# Patient Record
Sex: FEMALE | Race: BLACK OR AFRICAN AMERICAN | Employment: OTHER | URBAN - METROPOLITAN AREA
[De-identification: names, ages, dates, MRNs, and addresses within clinical notes are randomized per-mention and may not be internally consistent; named-entity substitution may affect disease eponyms.]

---

## 2018-10-03 ENCOUNTER — HOSPITAL ENCOUNTER (OUTPATIENT)
Dept: NON INVASIVE DIAGNOSTICS | Age: 63
Discharge: HOME | End: 2018-10-03
Payer: MEDICARE

## 2018-10-03 ENCOUNTER — HOSPITAL ENCOUNTER (OUTPATIENT)
Dept: LAB | Age: 63
Discharge: HOME | End: 2018-10-03
Payer: MEDICARE

## 2018-10-03 DIAGNOSIS — S83.241A ACUTE MEDIAL MENISCUS TEAR OF RIGHT KNEE: ICD-10-CM

## 2018-10-03 LAB
ATRIAL RATE: 95 BPM
CALCULATED P AXIS, ECG09: 72 DEGREES
CALCULATED R AXIS, ECG10: 68 DEGREES
CALCULATED T AXIS, ECG11: 72 DEGREES
DIAGNOSIS, 93000: NORMAL
HCT VFR BLD AUTO: 39.3 % (ref 35–45)
HGB BLD-MCNC: 13 G/DL (ref 12–16)
P-R INTERVAL, ECG05: 162 MS
POTASSIUM SERPL-SCNC: 3.2 MMOL/L (ref 3.5–5.5)
Q-T INTERVAL, ECG07: 354 MS
QRS DURATION, ECG06: 90 MS
QTC CALCULATION (BEZET), ECG08: 444 MS
VENTRICULAR RATE, ECG03: 95 BPM

## 2018-10-03 PROCEDURE — 84132 ASSAY OF SERUM POTASSIUM: CPT | Performed by: ORTHOPAEDIC SURGERY

## 2018-10-03 PROCEDURE — 93005 ELECTROCARDIOGRAM TRACING: CPT

## 2018-10-03 PROCEDURE — 85018 HEMOGLOBIN: CPT | Performed by: ORTHOPAEDIC SURGERY

## 2018-10-03 PROCEDURE — 36415 COLL VENOUS BLD VENIPUNCTURE: CPT | Performed by: ORTHOPAEDIC SURGERY

## 2018-10-04 LAB
FAX TO INFO,FAXT: NORMAL
FAX TO NUMBER,FAXN: NORMAL

## 2019-10-04 ENCOUNTER — HOSPITAL ENCOUNTER (OUTPATIENT)
Dept: PREADMISSION TESTING | Age: 64
Discharge: HOME | End: 2019-10-04
Payer: MEDICARE

## 2019-10-04 DIAGNOSIS — Z01.812 BLOOD TESTS PRIOR TO TREATMENT OR PROCEDURE: ICD-10-CM

## 2019-10-04 DIAGNOSIS — M17.11 OSTEOARTHRITIS OF RIGHT KNEE: ICD-10-CM

## 2019-10-04 LAB
ALBUMIN SERPL-MCNC: 3.9 G/DL (ref 3.4–5)
ALBUMIN/GLOB SERPL: 1 {RATIO} (ref 0.8–1.7)
ALP SERPL-CCNC: 77 U/L (ref 45–117)
ALT SERPL-CCNC: 29 U/L (ref 13–56)
ANION GAP SERPL CALC-SCNC: 3 MMOL/L (ref 3–18)
APPEARANCE UR: ABNORMAL
APTT PPP: 27.2 SEC (ref 23–36.4)
AST SERPL-CCNC: 20 U/L (ref 10–38)
BACTERIA SPEC CULT: NORMAL
BACTERIA URNS QL MICRO: ABNORMAL /HPF
BASOPHILS # BLD: 0 K/UL (ref 0–0.1)
BASOPHILS NFR BLD: 0 % (ref 0–2)
BILIRUB SERPL-MCNC: 0.3 MG/DL (ref 0.2–1)
BILIRUB UR QL: NEGATIVE
BUN SERPL-MCNC: 12 MG/DL (ref 7–18)
BUN/CREAT SERPL: 11 (ref 12–20)
CALCIUM SERPL-MCNC: 9.3 MG/DL (ref 8.5–10.1)
CHLORIDE SERPL-SCNC: 106 MMOL/L (ref 100–111)
CO2 SERPL-SCNC: 31 MMOL/L (ref 21–32)
COLOR UR: YELLOW
CREAT SERPL-MCNC: 1.13 MG/DL (ref 0.6–1.3)
CRP SERPL-MCNC: 0.5 MG/DL (ref 0–0.3)
DIFFERENTIAL METHOD BLD: ABNORMAL
EOSINOPHIL # BLD: 0.2 K/UL (ref 0–0.4)
EOSINOPHIL NFR BLD: 3 % (ref 0–5)
EPITH CASTS URNS QL MICRO: ABNORMAL /LPF (ref 0–5)
ERYTHROCYTE [DISTWIDTH] IN BLOOD BY AUTOMATED COUNT: 15.3 % (ref 11.6–14.5)
ERYTHROCYTE [SEDIMENTATION RATE] IN BLOOD: 22 MM/HR (ref 0–30)
GLOBULIN SER CALC-MCNC: 3.9 G/DL (ref 2–4)
GLUCOSE SERPL-MCNC: 89 MG/DL (ref 74–99)
GLUCOSE UR STRIP.AUTO-MCNC: NEGATIVE MG/DL
HBA1C MFR BLD: 6 % (ref 4.2–5.6)
HCT VFR BLD AUTO: 42.5 % (ref 35–45)
HGB BLD-MCNC: 13.6 G/DL (ref 12–16)
HGB UR QL STRIP: NEGATIVE
INR PPP: 0.9 (ref 0.8–1.2)
KETONES UR QL STRIP.AUTO: NEGATIVE MG/DL
LEUKOCYTE ESTERASE UR QL STRIP.AUTO: ABNORMAL
LYMPHOCYTES # BLD: 2.8 K/UL (ref 0.9–3.6)
LYMPHOCYTES NFR BLD: 56 % (ref 21–52)
MCH RBC QN AUTO: 29.9 PG (ref 24–34)
MCHC RBC AUTO-ENTMCNC: 32 G/DL (ref 31–37)
MCV RBC AUTO: 93.4 FL (ref 74–97)
MONOCYTES # BLD: 0.5 K/UL (ref 0.05–1.2)
MONOCYTES NFR BLD: 11 % (ref 3–10)
NEUTS SEG # BLD: 1.5 K/UL (ref 1.8–8)
NEUTS SEG NFR BLD: 30 % (ref 40–73)
NITRITE UR QL STRIP.AUTO: NEGATIVE
PH UR STRIP: 6.5 [PH] (ref 5–8)
PLATELET # BLD AUTO: 292 K/UL (ref 135–420)
PMV BLD AUTO: 9.4 FL (ref 9.2–11.8)
POTASSIUM SERPL-SCNC: 4.1 MMOL/L (ref 3.5–5.5)
PROT SERPL-MCNC: 7.8 G/DL (ref 6.4–8.2)
PROT UR STRIP-MCNC: ABNORMAL MG/DL
PROTHROMBIN TIME: 12.2 SEC (ref 11.5–15.2)
RBC # BLD AUTO: 4.55 M/UL (ref 4.2–5.3)
RBC #/AREA URNS HPF: ABNORMAL /HPF (ref 0–5)
SERVICE CMNT-IMP: NORMAL
SODIUM SERPL-SCNC: 140 MMOL/L (ref 136–145)
SP GR UR REFRACTOMETRY: 1.01 (ref 1–1.03)
UROBILINOGEN UR QL STRIP.AUTO: 0.2 EU/DL (ref 0.2–1)
WBC # BLD AUTO: 4.9 K/UL (ref 4.6–13.2)
WBC URNS QL MICRO: ABNORMAL /HPF (ref 0–5)

## 2019-10-04 PROCEDURE — 83036 HEMOGLOBIN GLYCOSYLATED A1C: CPT

## 2019-10-04 PROCEDURE — 81001 URINALYSIS AUTO W/SCOPE: CPT

## 2019-10-04 PROCEDURE — 85610 PROTHROMBIN TIME: CPT

## 2019-10-04 PROCEDURE — 93005 ELECTROCARDIOGRAM TRACING: CPT

## 2019-10-04 PROCEDURE — 80053 COMPREHEN METABOLIC PANEL: CPT

## 2019-10-04 PROCEDURE — 87641 MR-STAPH DNA AMP PROBE: CPT

## 2019-10-04 PROCEDURE — 85025 COMPLETE CBC W/AUTO DIFF WBC: CPT

## 2019-10-04 PROCEDURE — 86140 C-REACTIVE PROTEIN: CPT

## 2019-10-04 PROCEDURE — 36415 COLL VENOUS BLD VENIPUNCTURE: CPT

## 2019-10-04 PROCEDURE — 85652 RBC SED RATE AUTOMATED: CPT

## 2019-10-04 PROCEDURE — 85730 THROMBOPLASTIN TIME PARTIAL: CPT

## 2019-10-05 LAB
ATRIAL RATE: 80 BPM
CALCULATED P AXIS, ECG09: 67 DEGREES
CALCULATED R AXIS, ECG10: 55 DEGREES
CALCULATED T AXIS, ECG11: 68 DEGREES
DIAGNOSIS, 93000: NORMAL
P-R INTERVAL, ECG05: 154 MS
Q-T INTERVAL, ECG07: 384 MS
QRS DURATION, ECG06: 82 MS
QTC CALCULATION (BEZET), ECG08: 442 MS
VENTRICULAR RATE, ECG03: 80 BPM

## 2019-10-24 NOTE — ROUTINE PROCESS
Addison Al has decided with their surgeon to have a joint replacement to improve mobility and decrease pain. Joint Replacement Pre-Operative class was attended 10/24/2019. Topics discussed included surgery preparation, what to expect the day of surgery, medications (to include a multimodal approach to pain control and limiting narcotics), nutrition, glycemic control, respiratory therapy, physical and occupational therapy, and discharge planning. Discussed the importance of using these alternative pain management methods with the goal of using less opioid use after surgery and at home. A patient education notebook was provided and the opportunity was given to ask questions. The phone number of the Orthopaedic  was provided for any future questions or concerns.

## 2019-10-29 NOTE — H&P
[address] [facility] [address]  History and Physical Exam    Patient: Kamille Torres MRN: [id number]  SSN: [id number]    YOB: 1955  Age: 60 y.o. Sex: female      Subjective:      Chief Complaint: right knee pain    History of Present Illness:  Patient complains of knee pain on the affected side and difficulty ambulating. Pain is increased with increasing activity. Pain is increased with weight bearing. Pain interferes with activities of daily living. Patient has noticed decreased range of motion. Past Medical History:   Diagnosis Date    Arthritis     Autoimmune disease ([facility] [address]     fibromyalgia    Chronic back pain     High cholesterol     Hypertension     Morbid obesity ([facility] [address]     Pain management     Psychiatric disorder      Past Surgical History:   Procedure Laterality Date    HX BACK SURGERY      HX  SECTION      HX ORTHOPAEDIC      c 4 c5 l4 l5 sugery     Social History     Occupational History    Not on file   Tobacco Use    Smoking status: Current Every Day Smoker     Packs/day: 0.25    Smokeless tobacco: Never Used    Tobacco comment: advised to stop smoking 24hrs before surgery   Substance and Sexual Activity    Alcohol use: No    Drug use: No    Sexual activity: Not on file     Prior to Admission medications    Medication Sig Start Date End Date Taking? Authorizing Provider   allopurinol (ZYLOPRIM) 100 mg tablet Take 100 mg by mouth daily. Provider, Historical   escitalopram oxalate (LEXAPRO) 10 mg tablet Take 10 mg by mouth daily. Provider, Historical   pregabalin (LYRICA) 100 mg capsule Take 100 mg by mouth three (3) times daily. Provider, Historical   hydroCHLOROthiazide (HYDRODIURIL) 12.5 mg tablet Take 12.5 mg by mouth daily. Provider, Historical   meloxicam (MOBIC) 15 mg tablet Take 15 mg by mouth daily. Provider, Historical   rosuvastatin (CRESTOR) 20 mg tablet Take 20 mg by mouth nightly.     Provider, Historical amoxicillin (AMOXIL) 875 mg tablet Take 875 mg by mouth two (2) times a day. Indications: Acute Bacterial Infection of the Sinuses    Provider, Historical   fluticasone propionate (FLOVENT DISKUS) 50 mcg/actuation inhaler Take 2 Puffs by inhalation. Provider, Historical   polyvinyl alcohol-povidon,PF, (REFRESH CLASSIC) 1.4-0.6 % ophthalmic solution Administer 1-2 Drops to both eyes as needed. Provider, Historical   oxyCODONE-acetaminophen (ROXICET) 5-325 mg/5 mL solution Take 5 mL by mouth every four (4) hours as needed for Pain. Provider, Historical   oxyCODONE-acetaminophen (PERCOCET) 5-325 mg per tablet Take 1 Tab by mouth every four (4) hours as needed for Pain. Provider, Historical   amitriptyline (ELAVIL) 10 mg tablet Take 25 mg by mouth nightly. Other, MD Alize       Family History Non-contributory. Allergies: Allergies   Allergen Reactions    Latex Rash    Pcn [Penicillins] Unknown (comments)        Review of Systems:  A comprehensive review of systems was negative. Objective:       Physical Exam:  General:  Alert, oriented, pleasant, well-groomed  HEENT:  Normocephalic, atraumatic  Lungs:   Clear to auscultation  Heart:    Regular rate and rhythm  Abdomen:  Soft, non-tender  Extremities:   Pain with range of motion of the affected knee    ROM: decreased    Crepitus with motion of affected knee    Mild effusion       Xrays:   Xrays demonstrate severe arthritic changes including sunchondral cysts, subchondral sclerosis, periarticular osteophytes, and joint space narrowing. Assessment:      Arthritis of the affected knee. This has significantly affected the patient's quality of life. It interferes with activities of daily living. It is worse with weight bearing and activity. Plan:       Proceed with scheduled right total knee.     The patient has failed conservative measures including anti-inflammatories, injections, activity modification, and a trial of physical therapy or external joint support which includes a home exercise program.    The various methods of treatment have been discussed with the patient and family. After consideration of risks, benefits, and other options for treatment, the patient has consented to surgical interventions. Questions were answered and preoperative teaching was done by Dr. Fiona Keys.      Signed By: Owen Hill PA-C     October 29, 2019

## 2019-10-30 ENCOUNTER — ANESTHESIA (OUTPATIENT)
Dept: SURGERY | Age: 64
End: 2019-10-30
Payer: MEDICARE

## 2019-10-30 ENCOUNTER — HOSPITAL ENCOUNTER (OUTPATIENT)
Age: 64
LOS: 1 days | Discharge: HOME HEALTH | End: 2019-10-31
Attending: ORTHOPAEDIC SURGERY | Admitting: ORTHOPAEDIC SURGERY
Payer: MEDICARE

## 2019-10-30 ENCOUNTER — APPOINTMENT (OUTPATIENT)
Dept: GENERAL RADIOLOGY | Age: 64
End: 2019-10-30
Attending: PHYSICIAN ASSISTANT
Payer: MEDICARE

## 2019-10-30 ENCOUNTER — ANESTHESIA EVENT (OUTPATIENT)
Dept: SURGERY | Age: 64
End: 2019-10-30
Payer: MEDICARE

## 2019-10-30 DIAGNOSIS — M17.11 PRIMARY OSTEOARTHRITIS OF RIGHT KNEE: Primary | ICD-10-CM

## 2019-10-30 LAB
ABO + RH BLD: NORMAL
BLOOD GROUP ANTIBODIES SERPL: NORMAL
GLUCOSE BLD STRIP.AUTO-MCNC: 126 MG/DL (ref 70–110)
SPECIMEN EXP DATE BLD: NORMAL

## 2019-10-30 PROCEDURE — 76060000034 HC ANESTHESIA 1.5 TO 2 HR: Performed by: ORTHOPAEDIC SURGERY

## 2019-10-30 PROCEDURE — 77030018846 HC SOL IRR STRL H20 ICUM -A: Performed by: ORTHOPAEDIC SURGERY

## 2019-10-30 PROCEDURE — 77030036563 HC WRP CLD THER KNE S2SG -B: Performed by: ORTHOPAEDIC SURGERY

## 2019-10-30 PROCEDURE — C9290 INJ, BUPIVACAINE LIPOSOME: HCPCS | Performed by: ORTHOPAEDIC SURGERY

## 2019-10-30 PROCEDURE — 74011250636 HC RX REV CODE- 250/636: Performed by: ORTHOPAEDIC SURGERY

## 2019-10-30 PROCEDURE — 77030040361 HC SLV COMPR DVT MDII -B: Performed by: ORTHOPAEDIC SURGERY

## 2019-10-30 PROCEDURE — 77030016060 HC NDL NRV BLK TELE -A: Performed by: ANESTHESIOLOGY

## 2019-10-30 PROCEDURE — 97161 PT EVAL LOW COMPLEX 20 MIN: CPT

## 2019-10-30 PROCEDURE — 77030035643 HC BLD SAW OSC PRECIS STRY -C: Performed by: ORTHOPAEDIC SURGERY

## 2019-10-30 PROCEDURE — 74011000250 HC RX REV CODE- 250: Performed by: NURSE ANESTHETIST, CERTIFIED REGISTERED

## 2019-10-30 PROCEDURE — 73560 X-RAY EXAM OF KNEE 1 OR 2: CPT

## 2019-10-30 PROCEDURE — 82962 GLUCOSE BLOOD TEST: CPT

## 2019-10-30 PROCEDURE — 77030013708 HC HNDPC SUC IRR PULS STRY –B: Performed by: ORTHOPAEDIC SURGERY

## 2019-10-30 PROCEDURE — 74011000250 HC RX REV CODE- 250: Performed by: PHYSICIAN ASSISTANT

## 2019-10-30 PROCEDURE — 64447 NJX AA&/STRD FEMORAL NRV IMG: CPT | Performed by: ORTHOPAEDIC SURGERY

## 2019-10-30 PROCEDURE — 77030020782 HC GWN BAIR PAWS FLX 3M -B: Performed by: ORTHOPAEDIC SURGERY

## 2019-10-30 PROCEDURE — 74011250637 HC RX REV CODE- 250/637: Performed by: PHYSICIAN ASSISTANT

## 2019-10-30 PROCEDURE — 74011250636 HC RX REV CODE- 250/636: Performed by: NURSE ANESTHETIST, CERTIFIED REGISTERED

## 2019-10-30 PROCEDURE — C1713 ANCHOR/SCREW BN/BN,TIS/BN: HCPCS | Performed by: ORTHOPAEDIC SURGERY

## 2019-10-30 PROCEDURE — 74011250636 HC RX REV CODE- 250/636: Performed by: PHYSICIAN ASSISTANT

## 2019-10-30 PROCEDURE — 76942 ECHO GUIDE FOR BIOPSY: CPT | Performed by: ORTHOPAEDIC SURGERY

## 2019-10-30 PROCEDURE — 86900 BLOOD TYPING SEROLOGIC ABO: CPT

## 2019-10-30 PROCEDURE — 77030002933 HC SUT MCRYL J&J -A: Performed by: ORTHOPAEDIC SURGERY

## 2019-10-30 PROCEDURE — 77030018835 HC SOL IRR LR ICUM -A: Performed by: ORTHOPAEDIC SURGERY

## 2019-10-30 PROCEDURE — 77030027138 HC INCENT SPIROMETER -A: Performed by: ORTHOPAEDIC SURGERY

## 2019-10-30 PROCEDURE — 77030037875 HC DRSG MEPILEX <16IN BORD MOLN -A: Performed by: ORTHOPAEDIC SURGERY

## 2019-10-30 PROCEDURE — 76210000016 HC OR PH I REC 1 TO 1.5 HR: Performed by: ORTHOPAEDIC SURGERY

## 2019-10-30 PROCEDURE — 74011250636 HC RX REV CODE- 250/636: Performed by: ANESTHESIOLOGY

## 2019-10-30 PROCEDURE — 76010000153 HC OR TIME 1.5 TO 2 HR: Performed by: ORTHOPAEDIC SURGERY

## 2019-10-30 PROCEDURE — 77030000032 HC CUF TRNQT ZIMM -B: Performed by: ORTHOPAEDIC SURGERY

## 2019-10-30 PROCEDURE — 77030018836 HC SOL IRR NACL ICUM -A: Performed by: ORTHOPAEDIC SURGERY

## 2019-10-30 PROCEDURE — 74011000258 HC RX REV CODE- 258: Performed by: NURSE ANESTHETIST, CERTIFIED REGISTERED

## 2019-10-30 PROCEDURE — 74011000258 HC RX REV CODE- 258: Performed by: ORTHOPAEDIC SURGERY

## 2019-10-30 PROCEDURE — 77030012893

## 2019-10-30 PROCEDURE — C1776 JOINT DEVICE (IMPLANTABLE): HCPCS | Performed by: ORTHOPAEDIC SURGERY

## 2019-10-30 PROCEDURE — 74011000250 HC RX REV CODE- 250: Performed by: ORTHOPAEDIC SURGERY

## 2019-10-30 PROCEDURE — 97116 GAIT TRAINING THERAPY: CPT

## 2019-10-30 PROCEDURE — 36415 COLL VENOUS BLD VENIPUNCTURE: CPT

## 2019-10-30 PROCEDURE — 77030031139 HC SUT VCRL2 J&J -A: Performed by: ORTHOPAEDIC SURGERY

## 2019-10-30 PROCEDURE — 77030039267 HC ADH SKN EXOFIN S2SG -B: Performed by: ORTHOPAEDIC SURGERY

## 2019-10-30 PROCEDURE — 77030012508 HC MSK AIRWY LMA AMBU -A: Performed by: ANESTHESIOLOGY

## 2019-10-30 PROCEDURE — 74011000258 HC RX REV CODE- 258: Performed by: PHYSICIAN ASSISTANT

## 2019-10-30 DEVICE — CRUCIATE RETAINING FEMORAL
Type: IMPLANTABLE DEVICE | Site: KNEE | Status: FUNCTIONAL
Brand: TRIATHLON

## 2019-10-30 DEVICE — COMPONENT TOT KNEE HYBRID POROUS X3 TRIATHLON: Type: IMPLANTABLE DEVICE | Site: KNEE | Status: FUNCTIONAL

## 2019-10-30 DEVICE — TIBIAL BEARING INSERT
Type: IMPLANTABLE DEVICE | Site: KNEE | Status: FUNCTIONAL
Brand: TRIATHLON

## 2019-10-30 DEVICE — PATELLA
Type: IMPLANTABLE DEVICE | Site: KNEE | Status: FUNCTIONAL
Brand: TRIATHLON

## 2019-10-30 DEVICE — TIBIAL COMPONENT
Type: IMPLANTABLE DEVICE | Site: KNEE | Status: FUNCTIONAL
Brand: TRIATHLON

## 2019-10-30 RX ORDER — ROPIVACAINE HYDROCHLORIDE 5 MG/ML
INJECTION, SOLUTION EPIDURAL; INFILTRATION; PERINEURAL
Status: COMPLETED | OUTPATIENT
Start: 2019-10-30 | End: 2019-10-30

## 2019-10-30 RX ORDER — LIDOCAINE HYDROCHLORIDE 20 MG/ML
INJECTION, SOLUTION EPIDURAL; INFILTRATION; INTRACAUDAL; PERINEURAL AS NEEDED
Status: DISCONTINUED | OUTPATIENT
Start: 2019-10-30 | End: 2019-10-30 | Stop reason: HOSPADM

## 2019-10-30 RX ORDER — SODIUM CHLORIDE 0.9 % (FLUSH) 0.9 %
5-40 SYRINGE (ML) INJECTION EVERY 8 HOURS
Status: DISCONTINUED | OUTPATIENT
Start: 2019-10-30 | End: 2019-10-31 | Stop reason: HOSPADM

## 2019-10-30 RX ORDER — FENTANYL CITRATE 50 UG/ML
25 INJECTION, SOLUTION INTRAMUSCULAR; INTRAVENOUS
Status: DISCONTINUED | OUTPATIENT
Start: 2019-10-30 | End: 2019-10-30 | Stop reason: HOSPADM

## 2019-10-30 RX ORDER — SODIUM CHLORIDE 0.9 % (FLUSH) 0.9 %
5-40 SYRINGE (ML) INJECTION EVERY 8 HOURS
Status: DISCONTINUED | OUTPATIENT
Start: 2019-10-30 | End: 2019-10-30 | Stop reason: HOSPADM

## 2019-10-30 RX ORDER — ONDANSETRON 2 MG/ML
4 INJECTION INTRAMUSCULAR; INTRAVENOUS ONCE
Status: DISCONTINUED | OUTPATIENT
Start: 2019-10-30 | End: 2019-10-30 | Stop reason: HOSPADM

## 2019-10-30 RX ORDER — CEFAZOLIN SODIUM/WATER 2 G/20 ML
2 SYRINGE (ML) INTRAVENOUS ONCE
Status: COMPLETED | OUTPATIENT
Start: 2019-10-30 | End: 2019-10-30

## 2019-10-30 RX ORDER — DIPHENHYDRAMINE HYDROCHLORIDE 50 MG/ML
12.5 INJECTION, SOLUTION INTRAMUSCULAR; INTRAVENOUS
Status: DISCONTINUED | OUTPATIENT
Start: 2019-10-30 | End: 2019-10-30 | Stop reason: HOSPADM

## 2019-10-30 RX ORDER — SENNOSIDES 8.6 MG/1
1 TABLET ORAL 2 TIMES DAILY
Status: DISCONTINUED | OUTPATIENT
Start: 2019-10-30 | End: 2019-10-31 | Stop reason: HOSPADM

## 2019-10-30 RX ORDER — SODIUM CHLORIDE, SODIUM LACTATE, POTASSIUM CHLORIDE, CALCIUM CHLORIDE 600; 310; 30; 20 MG/100ML; MG/100ML; MG/100ML; MG/100ML
125 INJECTION, SOLUTION INTRAVENOUS CONTINUOUS
Status: DISCONTINUED | OUTPATIENT
Start: 2019-10-30 | End: 2019-10-31 | Stop reason: HOSPADM

## 2019-10-30 RX ORDER — DEXAMETHASONE SODIUM PHOSPHATE 4 MG/ML
INJECTION, SOLUTION INTRA-ARTICULAR; INTRALESIONAL; INTRAMUSCULAR; INTRAVENOUS; SOFT TISSUE AS NEEDED
Status: DISCONTINUED | OUTPATIENT
Start: 2019-10-30 | End: 2019-10-30 | Stop reason: HOSPADM

## 2019-10-30 RX ORDER — FLUTICASONE PROPIONATE 44 UG/1
1 AEROSOL, METERED RESPIRATORY (INHALATION) 2 TIMES DAILY
Status: DISCONTINUED | OUTPATIENT
Start: 2019-10-30 | End: 2019-10-31 | Stop reason: HOSPADM

## 2019-10-30 RX ORDER — MIDAZOLAM HYDROCHLORIDE 1 MG/ML
INJECTION, SOLUTION INTRAMUSCULAR; INTRAVENOUS
Status: COMPLETED | OUTPATIENT
Start: 2019-10-30 | End: 2019-10-30

## 2019-10-30 RX ORDER — SODIUM CHLORIDE, SODIUM LACTATE, POTASSIUM CHLORIDE, CALCIUM CHLORIDE 600; 310; 30; 20 MG/100ML; MG/100ML; MG/100ML; MG/100ML
150 INJECTION, SOLUTION INTRAVENOUS CONTINUOUS
Status: DISCONTINUED | OUTPATIENT
Start: 2019-10-30 | End: 2019-10-30 | Stop reason: HOSPADM

## 2019-10-30 RX ORDER — ALBUTEROL SULFATE 0.83 MG/ML
2.5 SOLUTION RESPIRATORY (INHALATION) AS NEEDED
Status: DISCONTINUED | OUTPATIENT
Start: 2019-10-30 | End: 2019-10-30 | Stop reason: HOSPADM

## 2019-10-30 RX ORDER — MAGNESIUM SULFATE 100 %
4 CRYSTALS MISCELLANEOUS AS NEEDED
Status: DISCONTINUED | OUTPATIENT
Start: 2019-10-30 | End: 2019-10-30 | Stop reason: HOSPADM

## 2019-10-30 RX ORDER — ALLOPURINOL 100 MG/1
100 TABLET ORAL DAILY
Status: DISCONTINUED | OUTPATIENT
Start: 2019-10-31 | End: 2019-10-31 | Stop reason: HOSPADM

## 2019-10-30 RX ORDER — HYDROCHLOROTHIAZIDE 12.5 MG/1
12.5 CAPSULE ORAL DAILY
Status: DISCONTINUED | OUTPATIENT
Start: 2019-10-31 | End: 2019-10-31 | Stop reason: HOSPADM

## 2019-10-30 RX ORDER — HYDROCODONE BITARTRATE AND ACETAMINOPHEN 5; 325 MG/1; MG/1
1 TABLET ORAL AS NEEDED
Status: DISCONTINUED | OUTPATIENT
Start: 2019-10-30 | End: 2019-10-30 | Stop reason: HOSPADM

## 2019-10-30 RX ORDER — ONDANSETRON 2 MG/ML
INJECTION INTRAMUSCULAR; INTRAVENOUS AS NEEDED
Status: DISCONTINUED | OUTPATIENT
Start: 2019-10-30 | End: 2019-10-30 | Stop reason: HOSPADM

## 2019-10-30 RX ORDER — HYDROMORPHONE HYDROCHLORIDE 2 MG/ML
0.2 INJECTION, SOLUTION INTRAMUSCULAR; INTRAVENOUS; SUBCUTANEOUS AS NEEDED
Status: DISCONTINUED | OUTPATIENT
Start: 2019-10-30 | End: 2019-10-30 | Stop reason: RX

## 2019-10-30 RX ORDER — PROPOFOL 10 MG/ML
INJECTION, EMULSION INTRAVENOUS AS NEEDED
Status: DISCONTINUED | OUTPATIENT
Start: 2019-10-30 | End: 2019-10-30 | Stop reason: HOSPADM

## 2019-10-30 RX ORDER — OXYCODONE AND ACETAMINOPHEN 5; 325 MG/1; MG/1
2 TABLET ORAL
Status: DISCONTINUED | OUTPATIENT
Start: 2019-10-30 | End: 2019-10-31 | Stop reason: HOSPADM

## 2019-10-30 RX ORDER — ROSUVASTATIN CALCIUM 10 MG/1
20 TABLET, COATED ORAL
Status: DISCONTINUED | OUTPATIENT
Start: 2019-10-30 | End: 2019-10-31 | Stop reason: HOSPADM

## 2019-10-30 RX ORDER — CEFAZOLIN SODIUM/WATER 2 G/20 ML
2 SYRINGE (ML) INTRAVENOUS EVERY 8 HOURS
Status: COMPLETED | OUTPATIENT
Start: 2019-10-30 | End: 2019-10-31

## 2019-10-30 RX ORDER — DIPHENHYDRAMINE HYDROCHLORIDE 50 MG/ML
12.5 INJECTION, SOLUTION INTRAMUSCULAR; INTRAVENOUS
Status: DISCONTINUED | OUTPATIENT
Start: 2019-10-30 | End: 2019-10-31 | Stop reason: HOSPADM

## 2019-10-30 RX ORDER — NALOXONE HYDROCHLORIDE 0.4 MG/ML
0.4 INJECTION, SOLUTION INTRAMUSCULAR; INTRAVENOUS; SUBCUTANEOUS AS NEEDED
Status: DISCONTINUED | OUTPATIENT
Start: 2019-10-30 | End: 2019-10-31 | Stop reason: HOSPADM

## 2019-10-30 RX ORDER — PREGABALIN 100 MG/1
100 CAPSULE ORAL 3 TIMES DAILY
Status: DISCONTINUED | OUTPATIENT
Start: 2019-10-30 | End: 2019-10-31 | Stop reason: HOSPADM

## 2019-10-30 RX ORDER — HYDROMORPHONE HYDROCHLORIDE 1 MG/ML
INJECTION, SOLUTION INTRAMUSCULAR; INTRAVENOUS; SUBCUTANEOUS AS NEEDED
Status: DISCONTINUED | OUTPATIENT
Start: 2019-10-30 | End: 2019-10-30 | Stop reason: HOSPADM

## 2019-10-30 RX ORDER — SODIUM CHLORIDE 0.9 % (FLUSH) 0.9 %
5-40 SYRINGE (ML) INJECTION AS NEEDED
Status: DISCONTINUED | OUTPATIENT
Start: 2019-10-30 | End: 2019-10-31 | Stop reason: HOSPADM

## 2019-10-30 RX ORDER — SODIUM CHLORIDE, SODIUM LACTATE, POTASSIUM CHLORIDE, CALCIUM CHLORIDE 600; 310; 30; 20 MG/100ML; MG/100ML; MG/100ML; MG/100ML
300 INJECTION, SOLUTION INTRAVENOUS CONTINUOUS
Status: DISCONTINUED | OUTPATIENT
Start: 2019-10-30 | End: 2019-10-31 | Stop reason: HOSPADM

## 2019-10-30 RX ORDER — SODIUM CHLORIDE 0.9 % (FLUSH) 0.9 %
5-40 SYRINGE (ML) INJECTION AS NEEDED
Status: DISCONTINUED | OUTPATIENT
Start: 2019-10-30 | End: 2019-10-30 | Stop reason: HOSPADM

## 2019-10-30 RX ORDER — KETAMINE HCL IN 0.9 % NACL 50 MG/5 ML
SYRINGE (ML) INTRAVENOUS AS NEEDED
Status: DISCONTINUED | OUTPATIENT
Start: 2019-10-30 | End: 2019-10-30 | Stop reason: HOSPADM

## 2019-10-30 RX ORDER — NALOXONE HYDROCHLORIDE 0.4 MG/ML
0.1 INJECTION, SOLUTION INTRAMUSCULAR; INTRAVENOUS; SUBCUTANEOUS AS NEEDED
Status: DISCONTINUED | OUTPATIENT
Start: 2019-10-30 | End: 2019-10-30 | Stop reason: HOSPADM

## 2019-10-30 RX ORDER — ESCITALOPRAM OXALATE 10 MG/1
10 TABLET ORAL DAILY
Status: DISCONTINUED | OUTPATIENT
Start: 2019-10-31 | End: 2019-10-31 | Stop reason: HOSPADM

## 2019-10-30 RX ORDER — AMITRIPTYLINE HYDROCHLORIDE 50 MG/1
25 TABLET, FILM COATED ORAL
Status: DISCONTINUED | OUTPATIENT
Start: 2019-10-30 | End: 2019-10-31 | Stop reason: HOSPADM

## 2019-10-30 RX ORDER — PHENYLEPHRINE HCL IN 0.9% NACL 1 MG/10 ML
SYRINGE (ML) INTRAVENOUS AS NEEDED
Status: DISCONTINUED | OUTPATIENT
Start: 2019-10-30 | End: 2019-10-30 | Stop reason: HOSPADM

## 2019-10-30 RX ORDER — ONDANSETRON 2 MG/ML
4 INJECTION INTRAMUSCULAR; INTRAVENOUS
Status: DISCONTINUED | OUTPATIENT
Start: 2019-10-30 | End: 2019-10-31 | Stop reason: HOSPADM

## 2019-10-30 RX ORDER — HYDROMORPHONE HYDROCHLORIDE 1 MG/ML
1 INJECTION, SOLUTION INTRAMUSCULAR; INTRAVENOUS; SUBCUTANEOUS
Status: DISCONTINUED | OUTPATIENT
Start: 2019-10-30 | End: 2019-10-31 | Stop reason: HOSPADM

## 2019-10-30 RX ORDER — GLYCOPYRROLATE 0.2 MG/ML
INJECTION INTRAMUSCULAR; INTRAVENOUS AS NEEDED
Status: DISCONTINUED | OUTPATIENT
Start: 2019-10-30 | End: 2019-10-30 | Stop reason: HOSPADM

## 2019-10-30 RX ORDER — EPHEDRINE SULFATE/0.9% NACL/PF 50 MG/5 ML
SYRINGE (ML) INTRAVENOUS AS NEEDED
Status: DISCONTINUED | OUTPATIENT
Start: 2019-10-30 | End: 2019-10-30 | Stop reason: HOSPADM

## 2019-10-30 RX ORDER — HYDROMORPHONE HYDROCHLORIDE 2 MG/ML
0.2 INJECTION, SOLUTION INTRAMUSCULAR; INTRAVENOUS; SUBCUTANEOUS AS NEEDED
Status: DISCONTINUED | OUTPATIENT
Start: 2019-10-30 | End: 2019-10-30 | Stop reason: HOSPADM

## 2019-10-30 RX ORDER — MELOXICAM 7.5 MG/1
15 TABLET ORAL DAILY
Status: DISCONTINUED | OUTPATIENT
Start: 2019-10-31 | End: 2019-10-31 | Stop reason: HOSPADM

## 2019-10-30 RX ADMIN — DEXMEDETOMIDINE HYDROCHLORIDE 10 MCG: 100 INJECTION, SOLUTION INTRAVENOUS at 12:17

## 2019-10-30 RX ADMIN — DEXAMETHASONE SODIUM PHOSPHATE 8 MG: 4 INJECTION, SOLUTION INTRAMUSCULAR; INTRAVENOUS at 12:05

## 2019-10-30 RX ADMIN — Medication 10 MG: at 11:58

## 2019-10-30 RX ADMIN — HYDROMORPHONE HYDROCHLORIDE 0.5 MG: 1 INJECTION, SOLUTION INTRAMUSCULAR; INTRAVENOUS; SUBCUTANEOUS at 11:47

## 2019-10-30 RX ADMIN — Medication 10 MG: at 12:26

## 2019-10-30 RX ADMIN — SODIUM CHLORIDE, SODIUM LACTATE, POTASSIUM CHLORIDE, AND CALCIUM CHLORIDE: 600; 310; 30; 20 INJECTION, SOLUTION INTRAVENOUS at 12:00

## 2019-10-30 RX ADMIN — OXYCODONE HYDROCHLORIDE AND ACETAMINOPHEN 2 TABLET: 5; 325 TABLET ORAL at 20:59

## 2019-10-30 RX ADMIN — DEXMEDETOMIDINE HYDROCHLORIDE 10 MCG: 100 INJECTION, SOLUTION INTRAVENOUS at 13:27

## 2019-10-30 RX ADMIN — AMITRIPTYLINE HYDROCHLORIDE 25 MG: 50 TABLET, FILM COATED ORAL at 22:39

## 2019-10-30 RX ADMIN — MIDAZOLAM 2 MG: 1 INJECTION INTRAMUSCULAR; INTRAVENOUS at 10:24

## 2019-10-30 RX ADMIN — Medication 100 MCG: at 12:57

## 2019-10-30 RX ADMIN — GLYCOPYRROLATE 0.1 MG: 0.2 INJECTION INTRAMUSCULAR; INTRAVENOUS at 11:47

## 2019-10-30 RX ADMIN — Medication 100 MCG: at 12:00

## 2019-10-30 RX ADMIN — Medication 10 MG: at 12:32

## 2019-10-30 RX ADMIN — SODIUM CHLORIDE, SODIUM LACTATE, POTASSIUM CHLORIDE, AND CALCIUM CHLORIDE: 600; 310; 30; 20 INJECTION, SOLUTION INTRAVENOUS at 12:47

## 2019-10-30 RX ADMIN — ROPIVACAINE HYDROCHLORIDE 20 ML: 5 INJECTION, SOLUTION EPIDURAL; INFILTRATION; PERINEURAL at 10:24

## 2019-10-30 RX ADMIN — Medication 2 G: at 11:59

## 2019-10-30 RX ADMIN — SENNOSIDES 8.6 MG: 8.6 TABLET, FILM COATED ORAL at 20:31

## 2019-10-30 RX ADMIN — MIDAZOLAM 2 MG: 1 INJECTION INTRAMUSCULAR; INTRAVENOUS at 11:47

## 2019-10-30 RX ADMIN — PROPOFOL 150 MG: 10 INJECTION, EMULSION INTRAVENOUS at 11:54

## 2019-10-30 RX ADMIN — ONDANSETRON HYDROCHLORIDE 4 MG: 2 INJECTION INTRAMUSCULAR; INTRAVENOUS at 12:05

## 2019-10-30 RX ADMIN — SODIUM CHLORIDE, SODIUM LACTATE, POTASSIUM CHLORIDE, AND CALCIUM CHLORIDE 125 ML/HR: 600; 310; 30; 20 INJECTION, SOLUTION INTRAVENOUS at 09:02

## 2019-10-30 RX ADMIN — LIDOCAINE HYDROCHLORIDE 100 MG: 20 INJECTION, SOLUTION EPIDURAL; INFILTRATION; INTRACAUDAL; PERINEURAL at 11:54

## 2019-10-30 RX ADMIN — TRANEXAMIC ACID 1 G: 100 INJECTION, SOLUTION INTRAVENOUS at 13:02

## 2019-10-30 RX ADMIN — DEXMEDETOMIDINE HYDROCHLORIDE 10 MCG: 100 INJECTION, SOLUTION INTRAVENOUS at 12:00

## 2019-10-30 RX ADMIN — ROSUVASTATIN CALCIUM 20 MG: 10 TABLET, COATED ORAL at 22:38

## 2019-10-30 RX ADMIN — Medication 10 MG: at 12:17

## 2019-10-30 RX ADMIN — TRANEXAMIC ACID 1 G: 100 INJECTION, SOLUTION INTRAVENOUS at 12:00

## 2019-10-30 RX ADMIN — PREGABALIN 100 MG: 100 CAPSULE ORAL at 22:39

## 2019-10-30 RX ADMIN — Medication 30 MG: at 12:06

## 2019-10-30 RX ADMIN — Medication 100 MCG: at 11:58

## 2019-10-30 RX ADMIN — Medication 2 G: at 20:31

## 2019-10-30 RX ADMIN — HYDROMORPHONE HYDROCHLORIDE 0.5 MG: 1 INJECTION, SOLUTION INTRAMUSCULAR; INTRAVENOUS; SUBCUTANEOUS at 12:10

## 2019-10-30 RX ADMIN — OXYCODONE HYDROCHLORIDE AND ACETAMINOPHEN 2 TABLET: 5; 325 TABLET ORAL at 16:59

## 2019-10-30 NOTE — BRIEF OP NOTE
BRIEF OPERATIVE NOTE    Date of Procedure: 10/30/2019   Preoperative Diagnosis:    Postoperative Diagnosis:      Procedure(s):  RIGHT TOTAL KNEE REPLACEMENT, \"SPEC POP\"  Surgeon(s) and Role:     Caren Bray MD - Primary         Surgical Assistant: none    Surgical Staff:  Circ-1: Jas Good RN  Physician Assistant: Marin Gaming PA-C  Scrub Tech-1: Pramod Livings  Surg Asst-1: Rivkadiaze Rumdonald  Event Time In Time Out   Incision Start 1207    Incision Close 1321      Anesthesia: Other   Estimated Blood Loss: 80cc  Specimens: * No specimens in log *   Findings: severe DJD   Complications: none  Implants:   Implant Name Type Inv.  Item Serial No.  Lot No. LRB No. Used Action   PAT ASYM MTL-BK 11MM SZ A38 -- TRIATHLON - [id number]  PAT ASYM MTL-BK 11MM SZ A38 -- TRIATHLON  [employer] HOW [id number] Right 1 Implanted   INSERT TIB ARTC BRNG SZ 4 9MM -- TRIATHLON X3 - [id number]  INSERT TIB ARTC BRNG SZ 4 9MM -- TRIATHLON X3  [employer] HOW [id number] Right 1 Implanted   COMPNT FEM CR TRIATHLN 4 R PA --  - [id number]  COMPNT FEM CR TRIATHLN 4 R PA --   [employer] HOW [id number] Right 1 Implanted   BASEPLT TIB PC TRITNM SZ 4 -- TRIATHLON - [id number]  BASEPLT TIB PC TRITNM SZ 4 -- TRIATHLON  [facility] [facility] [employer]EDICS HOW [id number] Right 1 Implanted

## 2019-10-30 NOTE — PROGRESS NOTES
Problem: Mobility Impaired (Adult and Pediatric)  Goal: *Acute Goals and Plan of Care (Insert Text)  Description  Physical Therapy Goals   Initiated 10/30/2019 and to be accomplished within 3-5 day(s)  1. Patient will move from supine <> sit with S in prep for out of bed activity and change of position. 2.  Patient will perform sit<> stand with S with LRAD in prep for transfers/ambulation. 3.  Patient will transfer from bed <> chair with S with LRAD for time up in chair for completion of ADL activity. 4.  Patient will ambulate 150 feet with LRAD/S for improved functional mobility/safe discharge. 5.  Patient will ascend/descend 3-5 stairs with handrail with minimal assistance/contact guard assist for home re-entry as needed. Outcome: Progressing Towards Goal   PHYSICAL THERAPY EVALUATION    Patient: Miguel Damon (22 y.o. female)  Date: 10/30/2019  Primary Diagnosis: Osteoarthritis of right knee [M17.11]  Procedure(s) (LRB):  RIGHT TOTAL KNEE REPLACEMENT, \"SPEC POP\" (Right) Day of Surgery   Precautions:Fall, WBAT    ASSESSMENT :  Based on the objective data described below, the patient presents with decrease independence w/ bed mobility, transfers, gait, and step negotiaiton. Pt seen in supine prior to session w/ supplemental O2, IV, BP, pulse ox, and B/L SCDs donned. Pt reported 8/10 pain at this time but reports already receiving pain meds prior to session. Pt has no c/o lightheadedness, dizziness or nausea. Pt's vitals assessed WNLs. Pt is very drowsy during session. Pt able to ambulate in the hallway w/ RW/GB and demonstrates a step to, antalgic gait, decrease jordan, decrease stride length, and decrease step clearance. Pt transferred back to room where pt was left in supine after session, call bell and tray in reach, B/L SCDs donned, vitals assessed WNLs, nurse notified after session. Patient will benefit from skilled intervention to address the above impairments.   Patients rehabilitation potential is considered to be Good  Factors which may influence rehabilitation potential include:   ? None noted  ? Mental ability/status  ? Medical condition  ? Home/family situation and support systems  ? Safety awareness  ? Pain tolerance/management  ? Other:      PLAN :  Recommendations and Planned Interventions:  ?           Bed Mobility Training             ? Neuromuscular Re-Education  ? Transfer Training                   ? Orthotic/Prosthetic Training  ? Gait Training                          ? Modalities  ? Therapeutic Exercises          ? Edema Management/Control  ? Therapeutic Activities            ? Patient and Family Training/Education  ? Other (comment):    Frequency/Duration: Patient will be followed by physical therapy twice daily to address goals. Discharge Recommendations: Home Health  Further Equipment Recommendations for Discharge: N/A     SUBJECTIVE:   Patient stated I was just about to take a nap.     OBJECTIVE DATA SUMMARY:     Past Medical History:   Diagnosis Date    Arthritis     Autoimmune disease ([facility] [address]     fibromyalgia    Chronic back pain     High cholesterol     Hypertension     Morbid obesity (HCC)     Pain management     Psychiatric disorder      Past Surgical History:   Procedure Laterality Date    HX BACK SURGERY      HX  SECTION      HX ORTHOPAEDIC      c 4 c5 l4 l5 sugery     Barriers to Learning/Limitations: yes;  physical  Compensate with: Verbal Cues and Tactile Cues  Prior Level of Function/Home Situation:   Home Situation  Home Environment: Private residence  # Steps to Enter: 1  One/Two Story Residence: Two story  # of Interior Steps: 11  Interior Rails: Left  Living Alone: No  Support Systems: Spouse/Significant Other/Partner  Patient Expects to be Discharged to[de-identified] Private residence  Current DME Used/Available at Home: Juanito Murray, rolling  Critical Behavior:  Neurologic State: Drowsy  Orientation Level: Oriented X4  Psychosocial  Purposeful Interaction: Yes  Pt Identified Daily Priority: Clinical issues (comment)  Caritas Process: Nurture loving kindness  Skin Condition/Temp: Dry;Warm  Skin Integrity: Incision (comment)(right knee)  Skin Integumentary  Skin Color: Appropriate for ethnicity  Skin Condition/Temp: Dry;Warm  Skin Integrity: Incision (comment)(right knee)  Turgor: Non-tenting  Hair Growth: Present  Varicosities: Absent  Strength:    Strength: Generally decreased, functional  Tone & Sensation:   Tone: Normal  Sensation: Intact  Range Of Motion:  AROM: Generally decreased, functional  Functional Mobility:  Bed Mobility:  Supine to Sit: Contact guard assistance  Sit to Supine: Contact guard assistance  Scooting: Contact guard assistance  Transfers:  Sit to Stand: Contact guard assistance;Minimum assistance  Stand to Sit: Contact guard assistance  Balance:   Sitting: Intact  Standing: Intact; With support  Ambulation/Gait Training:  Distance (ft): 45 Feet (ft)  Assistive Device: Gait belt;Walker, rolling  Ambulation - Level of Assistance: Contact guard assistance;Minimal assistance  Gait Description (WDL): Exceptions to WDL  Gait Abnormalities: Antalgic;Decreased step clearance; Step to gait  Right Side Weight Bearing: As tolerated  Base of Support: Shift to left  Stance: Right decreased  Speed/[place]: Slow  Step Length: Left shortened;Right shortened  Swing Pattern: Left asymmetrical;Right asymmetrical  Therapeutic Exercises: Therex packet handed to pt upon assessment  Pain:  Pain Scale 1: Numeric (0 - 10)  Pain Intensity 1: 6  Pain Location 1: Knee  Pain Orientation 1: Right  Pain Description 1: Aching  Pain Intervention(s) 1: Medication (see MAR)  Activity Tolerance:   Fair  Please refer to the flowsheet for vital signs taken during this treatment.   After treatment:   ?         Patient left in no apparent distress sitting up in chair  ? Patient left in no apparent distress in bed  ? Call bell left within reach  ? Nursing notified  ? Caregiver present (spouse)  ? Bed alarm activated    COMMUNICATION/EDUCATION:   ?         Fall prevention education was provided and the patient/caregiver indicated understanding. ? Patient/family have participated as able in goal setting and plan of care. ?         Patient/family agree to work toward stated goals and plan of care. ?         Patient understands intent and goals of therapy, but is neutral about his/her participation. ? Patient is unable to participate in goal setting and plan of care.     Thank you for this referral.  Ritchie Townsend, PT   Time Calculation: 23 mins   Eval Complexity: History: HIGH Complexity :3+ comorbidities / personal factors will impact the outcome/ POC Exam:LOW Complexity : 1-2 Standardized tests and measures addressing body structure, function, activity limitation and / or participation in recreation  Presentation: LOW Complexity : Stable, uncomplicated  Clinical Decision Making:Low Complexity ambulate >30ft  Overall Complexity:LOW

## 2019-10-30 NOTE — ANESTHESIA PROCEDURE NOTES
Peripheral Block    Start time: 10/30/2019 10:19 AM  End time: 10/30/2019 10:25 AM  Performed by: Neymar Howard MD  Authorized by: Neymar Howard MD       Pre-procedure: Indications: at surgeon's request, post-op pain management and procedure for pain    Preanesthetic Checklist: patient identified, risks and benefits discussed, site marked, timeout performed, anesthesia consent given and patient being monitored      Block Type:   Block Type:   Adductor canal  Laterality:  Right  Monitoring:  Standard ASA monitoring, continuous pulse ox, frequent vital sign checks, heart rate, oxygen and responsive to questions  Injection Technique:  Single shot  Procedures: ultrasound guided    Patient Position: supine  Prep: chlorhexidine    Location:  Mid thigh  Needle Type:  Stimuplex  Needle Gauge:  20 G  Needle Localization:  Anatomical landmarks and ultrasound guidance    Assessment:  Number of attempts:  1  Injection Assessment:  Incremental injection every 5 mL, negative aspiration for CSF, no paresthesia, ultrasound image on chart, low pressure verified by pressure monitor, no intravascular symptoms, negative aspiration for blood and local visualized surrounding nerve on ultrasound  Patient tolerance:  Patient tolerated the procedure well with no immediate complications

## 2019-10-30 NOTE — ADDENDUM NOTE
Addendum  created 10/30/19 1939 by Radha Cash MD    Attestation recorded in [facility] filed

## 2019-10-30 NOTE — INTERVAL H&P NOTE
H&P Update: 
Kendall Grullon was seen and examined. History and physical has been reviewed. The patient has been examined. There have been no significant clinical changes since the completion of the originally dated History and Physical. 
Patient identified by surgeon; surgical site was confirmed by patient and surgeon.

## 2019-10-30 NOTE — ANESTHESIA POSTPROCEDURE EVALUATION
Procedure(s):  RIGHT TOTAL KNEE REPLACEMENT, \"SPEC POP\". general    Anesthesia Post Evaluation      Multimodal analgesia: multimodal analgesia not used between 6 hours prior to anesthesia start to PACU discharge  Patient location during evaluation: bedside  Level of consciousness: awake and alert  Pain management: satisfactory to patient  Airway patency: patent  Anesthetic complications: no  Cardiovascular status: acceptable  Respiratory status: acceptable  Hydration status: acceptable  Post anesthesia nausea and vomiting:  none      Vitals Value Taken Time   /58 10/30/2019  2:50 PM   Temp 37.4 °C (99.4 °F) 10/30/2019  1:36 PM   Pulse 86 10/30/2019  2:51 PM   Resp 15 10/30/2019  2:51 PM   SpO2 96 % 10/30/2019  2:51 PM   Vitals shown include unvalidated device data.

## 2019-10-30 NOTE — OP NOTES
TOTAL KNEE ARTHROPLASTY OP NOTE      OPERATIVE REPORT    Patient: Reyes Dowd CSN: [id number] [id number]: [id number]    YOB: 1955  Age: 60 y.o. Sex: female      Admit Date: 10/30/2019  Admit Diagnosis: Osteoarthritis of right knee [M17.11]    Date of Procedure: 10/30/2019   Preoperative Diagnosis:    Postoperative Diagnosis:      Procedure: Procedure(s):  RIGHT TOTAL KNEE REPLACEMENT, \"SPEC POP\"  Surgeon: Petar Robin MD  Assistant(s):  BOYD Car  Anesthesia: Other   Estimated Blood Loss:<50CC  Specimens: * No specimens in log *   Complications: None  Implants:   Implant Name Type Inv. Item Serial No.  Lot No. LRB No. Used Action   PAT ASYM MTL-BK 11MM SZ A38 -- TRIATHLON - [id number]  PAT ASYM MTL-BK 11MM SZ A38 -- TRIATHLON  [employer] HOW [id number] Right 1 Implanted   INSERT TIB ARTC BRNG SZ 4 9MM -- TRIATHLON X3 - [id number]  INSERT TIB ARTC BRNG SZ 4 9MM -- TRIATHLON X3  [employer] HOW [id number] Right 1 Implanted   COMPNT FEM CR TRIATHLN 4 R PA --  - [id number]  COMPNT FEM CR TRIATHLN 4 R PA --   [employer] HOW [id number] Right 1 Implanted   BASEPLT TIB PC TRITNM SZ 4 -- TRIATHLON - [id number]  BASEPLT TIB PC TRITNM SZ 4 -- TRIATHLON  [employer] HOW [id number] Right 1 Implanted         INDICATIONS: The patient is a 60 y.o., female who has who has been suffering from knee arthritis. She has failed conservative therapy including activity modification, anti-inflammatories and injections. The arthritis is significantly impacting the patient's quality of life. We did discuss the option of total knee arthroplasty with them at length. She understood the risks and benefits including the risks of infection and blood clot, and elected to proceed with knee replacement.     DESCRIPTION OF PROCEDURE: After being informed of the risks and benefits, which include, but are not limited to, bleeding, infection, neurovascular damage, wound complications, pain and stiffness in the knee, periprosthetic loosening, fracture dislocation and DVT, the patient consented for the procedure. The patient was brought to the operating suite and properly identified. She was placed supine upon the operating table and placed under a general anesthetic. A regional block was placed in preoperative holding. Once an adequate level of anesthesia was obtained, a tourniquet was placed high on the operative thigh. The leg was prepped and draped in the usual sterile fashion. The leg was exsanguinated with an Esmarch. The tourniquet was inflated to 280 mmHg. Tourniquet time was approximately 1 hour. A longitudinal incision was made centered over the patella. Dissection was carried down through the subcutaneous tissue. The underlying capsule was identified and a medial parapatellar arthrotomy was made in standard fashion. The joint was exposed and evaluated. There were severe arthritic changes noted. The patella was everted. Approximately 1 cm of the articular surface was removed in a freehand fashion. Once that was completed, the knee was flexed. The distal intramedullary guide was placed and a 5-degree, 8-mm cut was made in standard fashion. The distal femur was sized, the 4-in-1 cutting block was placed in line with the posterior condyle and epicondylar access, and the anterior, posterior, and chamfer cuts were made in standard fashion. Attention was next turned to the tibia. Using the extramedullary tibial guide parallel to the axis of the tibia, we resected 9 mm below the higher plateau. The remaining plateau was sized. All osteophytes were removed. The remaining medial and lateral meniscus was excised. The trial implants were then placed with a trial insert. The knee had full flexion, full extension and good stability throughout. The PCL was noted to be intact. The patella was sized to the appropriate button. The keel holes were drilled, and the poly was placed.  The patella tracked nicely throughout full range of motion. The trial implants were then removed. The bone ends were irrigated and dried. The final implants were then pressed into place beginning with the tibia, followed by the femur, and lastly the patella. The polyethylene spacer was locked into the tibial tray. The tourniquet was deflated. Bleeding was controlled with electrocautery. Bleeding was not felt to be excessive enough to warrant a drain. The capsule was closed with a #1 Vicryl in a figure-of-eight interrupted fashion. Subcutaneous tissue was closed with 2-0 Vicryl. The skin was closed with 3-0 Monocryl in a running subcuticular fashion. Steri-strips and a sterile compressive dressing was applied. The patient was awakened and transferred to the recovery in stable condition. All needle and sponge counts were reported as correct at the end of the case.

## 2019-10-30 NOTE — PROGRESS NOTES
[id number] - Patient arrives to unit at this time. Admission completed at this time. Patient is A/O x 3. IV to right hand intact and patent. Plexis to feet and Eder applied to left leg. Elastic dressing to right leg CDI. Denies numbness/tingling. Pedal pulses palpable. Pain 0/10. Fall risk band on patient. Patient was oriented to the room to include use of call bell, meal ordering, and use of incentive spirometer patient able to get up to 1250 on IS. Patient was given explanation of \" up for dinner\" program and has verbalized understanding. Bed in lowest position. Phone and call bell left within reach. Patient educated on need to call for assistance before getting OOB. Plan of care for the day addressed with patient. Educated on pain medication availability and possible side effects. 1700-Refused to get in chair for meal.    1741-Pt reportedly walked in hallway with PT. Summary-voided some incontinence, ambulated some drowsiness, pt heard saying percocet knocks me right out.

## 2019-10-30 NOTE — PERIOP NOTES
TRANSFER - OUT REPORT:    Verbal report given to [facility] RN (name) on Christopher Salinas  being transferred to 2 S (unit) for routine progression of care       Report consisted of patients Situation, Background, Assessment and   Recommendations(SBAR). Information from the following report(s) SBAR, Kardex, OR Summary, Procedure Summary, Intake/Output and MAR was reviewed with the receiving nurse. Lines:   Peripheral IV 10/30/19 Posterior;Right Hand (Active)   Site Assessment Clean, dry, & intact 10/30/2019  3:16 PM   Phlebitis Assessment 0 10/30/2019  3:16 PM   Infiltration Assessment 0 10/30/2019  3:16 PM   Dressing Status Clean, dry, & intact 10/30/2019  3:16 PM   Dressing Type Transparent;Tape 10/30/2019  3:16 PM   Hub Color/Line Status Infusing 10/30/2019  3:16 PM        Intake/Output Summary (Last 24 hours) at 10/30/2019 1538  Last data filed at 10/30/2019 1538  Gross per 24 hour   Intake 2850 ml   Output 80 ml   Net 2770 ml       Opportunity for questions and clarification was provided.       Patient transported with:   O2 @ 2 liters  Registered Nurse

## 2019-10-30 NOTE — ROUTINE PROCESS
TRANSFER - IN REPORT: 
 
Verbal report received from [facility] JOSE JUAN RN(name) on Bobby Inman  being received from codebender) for routine post - op Report consisted of patients Situation, Background, Assessment and  
Recommendations(SBAR). Information from the following report(s) SBAR, OR Summary, Procedure Summary, Intake/Output, MAR and Recent Results was reviewed with the receiving nurse. Opportunity for questions and clarification was provided. Assessment completed upon patients arrival to unit and care assumed.

## 2019-10-31 VITALS
SYSTOLIC BLOOD PRESSURE: 112 MMHG | WEIGHT: 218.5 LBS | HEART RATE: 95 BPM | OXYGEN SATURATION: 100 % | DIASTOLIC BLOOD PRESSURE: 89 MMHG | BODY MASS INDEX: 36.4 KG/M2 | HEIGHT: 65 IN | TEMPERATURE: 98.3 F | RESPIRATION RATE: 16 BRPM

## 2019-10-31 LAB
ANION GAP SERPL CALC-SCNC: 7 MMOL/L (ref 3–18)
BUN SERPL-MCNC: 13 MG/DL (ref 7–18)
BUN/CREAT SERPL: 13 (ref 12–20)
CALCIUM SERPL-MCNC: 8.9 MG/DL (ref 8.5–10.1)
CHLORIDE SERPL-SCNC: 103 MMOL/L (ref 100–111)
CO2 SERPL-SCNC: 29 MMOL/L (ref 21–32)
CREAT SERPL-MCNC: 1.01 MG/DL (ref 0.6–1.3)
ERYTHROCYTE [DISTWIDTH] IN BLOOD BY AUTOMATED COUNT: 14.9 % (ref 11.6–14.5)
GLUCOSE SERPL-MCNC: 136 MG/DL (ref 74–99)
HCT VFR BLD AUTO: 36.9 % (ref 35–45)
HGB BLD-MCNC: 11.8 G/DL (ref 12–16)
MCH RBC QN AUTO: 29.9 PG (ref 24–34)
MCHC RBC AUTO-ENTMCNC: 32 G/DL (ref 31–37)
MCV RBC AUTO: 93.4 FL (ref 74–97)
PLATELET # BLD AUTO: 315 K/UL (ref 135–420)
PMV BLD AUTO: 9.5 FL (ref 9.2–11.8)
POTASSIUM SERPL-SCNC: 3.8 MMOL/L (ref 3.5–5.5)
RBC # BLD AUTO: 3.95 M/UL (ref 4.2–5.3)
SODIUM SERPL-SCNC: 139 MMOL/L (ref 136–145)
WBC # BLD AUTO: 11.1 K/UL (ref 4.6–13.2)

## 2019-10-31 PROCEDURE — 85027 COMPLETE CBC AUTOMATED: CPT

## 2019-10-31 PROCEDURE — 97116 GAIT TRAINING THERAPY: CPT

## 2019-10-31 PROCEDURE — 74011250637 HC RX REV CODE- 250/637: Performed by: PHYSICIAN ASSISTANT

## 2019-10-31 PROCEDURE — 36415 COLL VENOUS BLD VENIPUNCTURE: CPT

## 2019-10-31 PROCEDURE — 80048 BASIC METABOLIC PNL TOTAL CA: CPT

## 2019-10-31 PROCEDURE — 97110 THERAPEUTIC EXERCISES: CPT

## 2019-10-31 PROCEDURE — 74011250636 HC RX REV CODE- 250/636: Performed by: PHYSICIAN ASSISTANT

## 2019-10-31 RX ORDER — NALOXONE HYDROCHLORIDE 4 MG/.1ML
SPRAY NASAL
Qty: 3 EACH | Refills: 0 | Status: SHIPPED | OUTPATIENT
Start: 2019-10-31

## 2019-10-31 RX ORDER — OXYCODONE AND ACETAMINOPHEN 5; 325 MG/1; MG/1
2 TABLET ORAL
Qty: 30 TAB | Refills: 0 | Status: SHIPPED | OUTPATIENT
Start: 2019-10-31 | End: 2019-11-03

## 2019-10-31 RX ADMIN — ESCITALOPRAM OXALATE 10 MG: 10 TABLET ORAL at 08:09

## 2019-10-31 RX ADMIN — OXYCODONE HYDROCHLORIDE AND ACETAMINOPHEN 2 TABLET: 5; 325 TABLET ORAL at 04:26

## 2019-10-31 RX ADMIN — RIVAROXABAN 10 MG: 10 TABLET, FILM COATED ORAL at 00:51

## 2019-10-31 RX ADMIN — OXYCODONE HYDROCHLORIDE AND ACETAMINOPHEN 2 TABLET: 5; 325 TABLET ORAL at 08:09

## 2019-10-31 RX ADMIN — OXYCODONE HYDROCHLORIDE AND ACETAMINOPHEN 2 TABLET: 5; 325 TABLET ORAL at 00:50

## 2019-10-31 RX ADMIN — SENNOSIDES 8.6 MG: 8.6 TABLET, FILM COATED ORAL at 08:09

## 2019-10-31 RX ADMIN — HYDROCHLOROTHIAZIDE 12.5 MG: 12.5 CAPSULE ORAL at 08:09

## 2019-10-31 RX ADMIN — Medication 2 G: at 04:27

## 2019-10-31 RX ADMIN — MELOXICAM 15 MG: 7.5 TABLET ORAL at 08:09

## 2019-10-31 RX ADMIN — PREGABALIN 100 MG: 100 CAPSULE ORAL at 08:10

## 2019-10-31 RX ADMIN — ALLOPURINOL 100 MG: 100 TABLET ORAL at 08:09

## 2019-10-31 NOTE — PROGRESS NOTES
Transition of Care (CINDY) Plan:     Chart reviewed, met with pt and spouse in room prior to pt discharge home. FOC offered, pt chose [facility] [address] [id number] for follow up; referral placed with CMS. Pt has RW and 3:1 for home, delivered to room by [facility]. CINDY Transportation:   How is patient being transported at discharge? Family/Friend      When? Once cleared by Therapy between 12-2pm     Is transport scheduled? N/A      Follow-up appointment and transportation:   PCP/Specialist?  See AVS for Appointment         Who is transporting to the follow-up appointment? Family/Friend      Is transport for follow up appointment scheduled? N/A    Communication plan (with patient/family): Who is being called? Patient or Next of Kin? Responsible party? Patient      What number(s) is to be used? See Facesheet      What service provider is calling for [facility] services? When are they calling? 24-48 hours following discharge    Readmission Risk? (Green/Low; Yellow/Moderate; Red/High):  Green    Care Management Interventions  PCP Verified by CM:  Yes  Transition of Care Consult (CM Consult): [address]: No  Reason Outside [place]: Physician referred to specific agency([facility])  Discharge Durable Medical Equipment: No  Physical Therapy Consult: Yes  Occupational Therapy Consult: Yes  Current Support Network: Lives with Spouse  Confirm Follow Up Transport: Family  Plan discussed with Pt/Family/Caregiver: Yes  Freedom of Choice Offered: Yes  Discharge Location  Discharge Placement: Home with home health

## 2019-10-31 NOTE — DISCHARGE INSTRUCTIONS
Total Knee Arthroplasty Discharge Instructions   Darrel Lee M.D. Please take the time to review the following instructions before you leave the hospital and use them as guidelines during your recovery from surgery. If you have any questions you may contact my office at [phone]. Wound Care / Dressing Changes: You may change your dressing as needed. Beginning the day after you are discharged from the hospital you should change your dressing daily. A big, bulky dressing isn't necessary as long as there isn't any drainage from the incisions. You can put a band-aid or mepilex dressing over the incision. It isn't necessary to apply antibiotic ointment to your incisions. There will be a clear film covering your incision, do not remove. As the edges begin to peel, you may trim with small scissors. This film will fall of in about 1 month. Winter Fray / Bathing: You may only shower. You may shower if there is no drainage from your incisions. Your dressing may be removed for showering. You may get your incisions wet in the shower. Don't vigorously scrub the area where your incisions are. Apply a clean, dry dressing after drying off the area of your incisions. Don't take a tub bath, get in a swimming pool or Jacuzzi until the incisions are completely healed. Do not soak your incisions under water. Weight Bearing Status / Braces:     ___X__ Weight bearing as tolerated. Use crutches, walker, or cane as needed for support. You may move your joints as much as tolerated.     _____  John Prim Touch\" weight bearing. Don't bear weight on the leg you had operated on. Use your toes only to steady yourself as you ambulate with crutches or a walker. _____ Avoid extreme hip extension with external rotation. Home Health:    Home health has been arranged for skilled nursing visits and physical therapy. Your home health has been set up through____________ .  If no one from the agency calls you on the day after you arrive home, please contact them at the number provided at discharge. Physical therapy for gait training and strengthening. Continue therapeutic exercises. Physical Therapy:       Begin In-Home Physical Therapy 3 times a week to work on gait training, range of motion, strengthening, and weight bearing exercises as tolerable. Continue to use your walker or cane when walking. You may progress from the walker to a cane to full weight bearing as tolerable. Ice / Elevation:     Continue ice and elevation as needed for pain and swelling. Diet:     Resume your pre hospital diet. If you have excessive nausea or vomiting call your doctor's office. Medication:     1. You will be given a prescription for pain medication when you are discharged for the hospital. Take the medication as needed according to the directions on the prescription bottle. Possible side effects of the medication include dizziness, headache, nausea, vomiting, constipation and urinary retention. If you experience any of these side effects call the office so that we can assist you in relieving them. Discontinue the use of the pain medication if you develop itching, rash, shortness of breath or difficulties swallowing. If these symptoms become severe or aren't relieved by discontinuing the medication you should seek immediate medical attention. Refills of pain medication are authorized during office hours only. (8am - 5pm Mon. thru Fri.)     1. You should take Xarelto daily as directed for 12 days from the date of your surgery. This will help to prevent blood clots from forming in your legs. 2. You may resume the medication you were taking prior to your surgery. Pain medication may change the effects of any antidepressant medication you are taking.  If you have any questions about possible interactions between your regular medications and the pain medication you should consult the physician who prescribes your regular medications. 3.   Please take over the counter stool softeners while you are taking narcotic pain                  medication. Pain medications can cause constipation. Stool softeners, warm         prune juice and increasing your water and fiber intake can help prevent constipation. Do not take laxatives. Follow Up Appointment:   Please call [phone] for a follow appointment with Dr. Remigio Lockhart in 10-14 days from the time of your surgery. Please let our office know you are scheduling a post-op appointment. Signs and Symptoms to be Aware of: If any of the following signs and symptoms occur, you should contact Dr. Franck Delong office. Please be advised if a problem arises which you feel requires immediate medical attention or you are unable to contact Dr. Franck Delong office you should seek immediate medical attention at the emergency department or other health care facility you have access to. Signs and symptoms to watch for include:     1. A sudden increase in swelling and or redness or warmth at the area your surgery was performed which isn't relieved by rest, ice and elevation. 2 Oral temperature greater than 101.5 degrees for 12 hours or more which isn't relieved by an increase in fluid intake and taking two Tylenol every 4-6 hours. 3 Excessive drainage from your incisions, or drainage which hasn't stopped by 72 hours after your surgery despite applying a compressive dressing, ice and elevation. 4 Calfpain, tenderness, redness or swelling which isn't relieved with rest and elevation. 5 Fever, chills, shortness ofbreath, chest pain, nausea, vomiting or other signs and symptoms which are of concern to you.      Other Instructions:

## 2019-10-31 NOTE — PROGRESS NOTES
Discharge instructions reviewed with patient at this time. Opportunity for questions and clarification was provided. Patient has verbalized understanding. Patient was provided with care notes to include side effects of RX's. Arm bands removed and shredded. AVS reviewed with Patti Estrada. IV removed. ACE dressing to right leg removed. Mepilex dressing to right knee removed, incision intact. No s/s of infection noted. New mepilex applied. [facility] FLU screening has been completed and verified.  Vaccine during this admission: Refused

## 2019-10-31 NOTE — ROUTINE PROCESS
Lalitha Bassett has decided with their surgeon to have a joint replacement to improve mobility and decrease pain. Joint Replacement Pre-Operative class was attended 10/24/2019. Topics discussed included surgery preparation, what to expect the day of surgery, medications (to include a multimodal approach to pain control and limiting narcotics), nutrition, glycemic control, respiratory therapy, physical and occupational therapy, and discharge planning. Discussed the importance of using these alternative pain management methods with the goal of using less opioid use after surgery and at home. A patient education notebook was provided and the opportunity was given to ask questions. The phone number of the [facility]  was provided for any future questions or concerns.

## 2019-10-31 NOTE — ROUTINE PROCESS
Bedside and Verbal shift change report given to sofia s RN by Clearance Romberg, RN. Report included the following information SBAR, Kardex, OR Summary, Intake/Output and MAR.

## 2019-10-31 NOTE — DISCHARGE SUMMARY
Patient: Kendall Grullon               Sex: female         MRN: [id number]       YOB: 1955      Age:  60 y.o.        LOS:  LOS: 1 day                DOA: 10/30/2019    Discharge Date: 10/31/2019    Admission Diagnoses: Osteoarthritis of right knee [M17.11]    Discharge Diagnoses:    Problem List as of 10/31/2019 Date Reviewed: 10/30/2019          Codes Class Noted - Resolved    Osteoarthritis of right knee ICD-10-CM: M17.11  ICD-9-CM: 715.96  10/30/2019 - Present              This is a 60 y.o. female with a  history of ongoing knee pain secondary to degenerative joint disease. The patient has failed to respond to conservative care. The option of a total knee arthroplasty was discussed with the patient. Risks and benefits of the procedure were explained to the patient as well as other treatment options and possible surgical outcomes. The patient acknowledged and consent was obtained. The patient was therefore scheduled to undergo a right total knee arthroplasty with Dr. Yoon Pedersen. The patient was taken to the operating room for the above-stated procedure. IV antibiotics were given prior to the incision. SCDs were used for DVT prophylaxis. The patient had an estimated intraoperative blood loss of less than 100  mL. The patient tolerated the procedure well without any complications, and was taken to the recovery room in stable condition. The patient was then transferred to the postoperative orthopedic floor for convalescence, PT, pain management, as well as discharge planning. A consultations was made to the hospitalist whom continued to monitor the patient throughout the patient's hospitalizations. Physical therapy and occupational therapy initiated their evaluation and treatment and continued to follow the patient until the patient was discharged. For pain control, a femoral nerve block was used, and was discontinued on post-operative day 2.  The patient then was transitioned over to oral narcotics in which was well tolerated. DVT prophylaxis was initiated on the day of surgery including; Xarelto, compression stockings and bilateral foot pumps. At the time of discharge, the patient was able to ambulate safely, go up and down stairs and had an understanding of the explicit discharge precautions and instructions following surgery. Home Health will come out to assist the pateint with this,. The patient was discharged to follow up with Dr. Daryl Shields in approximately 10 to 14 days. Discharge Condition: Stable  DISPOSITION: Home. On the day of discharge the patient was afebrile. Vital signs were stable. The patient was in no acute distress. her Right knee incision was clean, dry, and intact. Extremity was warm and well-perfused, distally neurovascularly intact. DISCHARGE INSTRUCTIONS:  The patient will be discharged home on Xarelto 10mg daily for 12 days from the date of surgery. Continue physical therapy for range of motion, gait training and strengthening. Continue therapeutic exercises. Follow up in 10 to 14 days with Dr. Daryl Shields. DISCHARGE MEDICATIONS:   Current Discharge Medication List      START taking these medications    Details   rivaroxaban (XARELTO) 10 mg tablet Take 1 Tab by mouth daily for 11 days. Qty: 11 Tab, Refills: 0      naloxone (NARCAN) 4 mg/actuation nasal spray Use 1 spray intranasally, then discard. Repeat with new spray every 2 min as needed for opioid overdose symptoms, alternating nostrils. Qty: 3 Each, Refills: 0         CONTINUE these medications which have CHANGED    Details   oxyCODONE-acetaminophen (PERCOCET) 5-325 mg per tablet Take 2 Tabs by mouth every four (4) hours as needed for Pain for up to 3 days. Max Daily Amount: 12 Tabs. Qty: 30 Tab, Refills: 0    Associated Diagnoses: Primary osteoarthritis of right knee         CONTINUE these medications which have NOT CHANGED    Details   allopurinol (ZYLOPRIM) 100 mg tablet Take 100 mg by mouth daily. pregabalin (LYRICA) 100 mg capsule Take 100 mg by mouth three (3) times daily. polyvinyl alcohol-povidon,PF, (REFRESH CLASSIC) 1.4-0.6 % ophthalmic solution Administer 1-2 Drops to both eyes as needed. escitalopram oxalate (LEXAPRO) 10 mg tablet Take 10 mg by mouth daily. hydroCHLOROthiazide (HYDRODIURIL) 12.5 mg tablet Take 12.5 mg by mouth daily. meloxicam (MOBIC) 15 mg tablet Take 15 mg by mouth daily. rosuvastatin (CRESTOR) 20 mg tablet Take 20 mg by mouth nightly. fluticasone propionate (FLOVENT DISKUS) 50 mcg/actuation inhaler Take 2 Puffs by inhalation daily as needed. amitriptyline (ELAVIL) 10 mg tablet Take 25 mg by mouth nightly.          STOP taking these medications       amoxicillin (AMOXIL) 875 mg tablet Comments:   Reason for Stopping:         oxyCODONE-acetaminophen (ROXICET) 5-325 mg/5 mL solution Comments:   Reason for Stopping:

## 2019-10-31 NOTE — PROGRESS NOTES
Problem: Mobility Impaired (Adult and Pediatric)  Goal: *Acute Goals and Plan of Care (Insert Text)  Description  Physical Therapy Goals   Initiated 10/30/2019 and to be accomplished within 3-5 day(s)  1. Patient will move from supine <> sit with S in prep for out of bed activity and change of position. 2.  Patient will perform sit<> stand with S with LRAD in prep for transfers/ambulation. 3.  Patient will transfer from bed <> chair with S with LRAD for time up in chair for completion of ADL activity. 4.  Patient will ambulate 150 feet with LRAD/S for improved functional mobility/safe discharge. 5.  Patient will ascend/descend 3-5 stairs with handrail with minimal assistance/contact guard assist for home re-entry as needed. Outcome: Resolved/Met     PHYSICAL THERAPY TREATMENT/DISCHARGE    Patient: Ashish Alonso (42 y.o. female)  Date: 10/31/2019  Diagnosis: Osteoarthritis of right knee [M17.11] <principal problem not specified>  Procedure(s) (LRB):  RIGHT TOTAL KNEE REPLACEMENT, \"SPEC POP\" (Right) 1 Day Post-Op  Precautions: Fall, WBAT  Chart, physical therapy assessment, plan of care and goals were reviewed. ASSESSMENT:  Pt meets needs for safe home mobility, expresses understanding of HEP and is cleared from this level of PT. Progression toward goals:  ?      Goals met  ? Improving appropriately and progressing toward goals  ? Improving slowly and progressing toward goals  ? Not making progress toward goals and plan of care will be adjusted     PLAN:  Patient will be discharged from physical therapy at this time. Rationale for discharge:  ? Goals Achieved  ? Plateau Reached  ? Patient not participating in therapy  ? Other:  Discharge Recommendations:  Home Health  Further Equipment Recommendations for Discharge:  gait belt and rolling walker     SUBJECTIVE:   Patient stated I'm ready to go home.     OBJECTIVE DATA SUMMARY:   Critical Behavior:  Neurologic State: Appropriate for age, Alert  Orientation Level: Appropriate for age, Oriented X4  Functional Mobility Training:  Bed Mobility:  Rolling: Supervision  Supine to Sit: Supervision  Sit to Supine: Supervision  Scooting: Supervision    Transfers:  Sit to Stand: Supervision  Stand to Sit: Supervision  Balance:  Sitting: Intact  Standing: Intact; With support  Ambulation/Gait Training:  Distance (ft): 250 Feet (ft)  Assistive Device: Gait belt;Walker, rolling  Ambulation - Level of Assistance: Contact guard assistance;Minimal assistance  Gait Abnormalities: Antalgic;Decreased step clearance; Step to gait  Right Side Weight Bearing: As tolerated  Base of Support: Shift to left  Stance: Right decreased  Speed/[place]: Slow  Step Length: Left shortened;Right shortened  Swing Pattern: Left symmetrical;Right symmetrical  Stairs:  Number of Stairs Trained: 15  Stairs - Level of Assistance: Supervision   Rail Use: Left   Therapeutic Exercises:   HEP in full x 15 min  AAROM = 3-95 degrees  Pain:  Pain Scale 1: Numeric (0 - 10)  Pain Intensity 1: 8  Pain Location 1: Knee  Pain Orientation 1: Right  Pain Description 1: Aching  Pain Intervention(s) 1: Medication (see MAR)  Activity Tolerance:   Good  Please refer to the flowsheet for vital signs taken during this treatment. After treatment:   ? Patient left in no apparent distress sitting up in chair  ? Patient left in no apparent distress in bed  ? Call bell left within reach  ? Nursing notified  ? Caregiver present  ?  Bed alarm activated  Tori Boyer PTA   Time Calculation: 28 mins

## 2019-10-31 NOTE — PROGRESS NOTES
Problem: Falls - Risk of  Goal: *Absence of Falls  Description  Document Sukhdeep Hernandez Fall Risk and appropriate interventions in the flowsheet.   Outcome: Progressing Towards Goal  Note:   Fall Risk Interventions:  Mobility Interventions: Patient to call before getting OOB    Mentation Interventions: Adequate sleep, hydration, pain control    Medication Interventions: Patient to call before getting OOB    Elimination Interventions: Call light in reach

## 2019-10-31 NOTE — ROUTINE PROCESS
Bedside and Verbal shift change report given to DIMITRIS Estrada RN (oncoming nurse) by SHARITA Navarro RN (offgoing nurse). Report included the following information SBAR, Kardex, Intake/Output, MAR and Recent Results.

## 2019-10-31 NOTE — PROGRESS NOTES
Progress Note        Patient: Carlos A Grissom MRN: [id number]  SSN: [id number]    YOB: 1955  Age: 60 y.o. Sex: female      1 Day Post-Op status post Procedure(s) (LRB):  RIGHT TOTAL KNEE REPLACEMENT, \"SPEC POP\" (Right)    Admit Date: 10/30/2019  Admit Diagnosis: Osteoarthritis of right knee [M17.11]    Subjective:      Doing well. No complaints. No SOB. No Chest Pain. No Nausea or Vomiting. No problems eating or voiding. Objective:        Temp (24hrs), Av.1 °F (36.7 °C), Min:97.5 °F (36.4 °C), Max:99.4 °F (37.4 °C)    Body mass index is 36.36 kg/m². Patient Vitals for the past 12 hrs:   BP Temp Pulse Resp SpO2   10/31/19 0300 123/62 98.5 °F (36.9 °C) 87 16 100 %   10/30/19 2315 111/44 97.8 °F (36.6 °C) 89 16 98 %     Recent Labs     10/31/19  0326   HGB 11.8*   HCT 36.9      K 3.8      CO2 29   BUN 13   CREA 1.01   *       Physical Exam:  Vital Signs are Stable. No Acute Distress. Alert and Oriented. Negative Homans sign. Toes AROM Full. Neurovascular exam is normal.    Dressing is Clean, Dry, and Intact. Assessment/Plan:     1. Continue oob/rehab  2. Dressing change today  3. Continue PT/OT  4. Continue CPM/ROM  5.  Discharge planning    Discharge Plan: Home    Signed By: Sofy Montoya PA-C     2019

## 2019-10-31 NOTE — PROGRESS NOTES
2033 -  Head to toe assessment performed at this time. Pt denies any chest pain or SOB. Pt denies any numbness or tingling to extremities. Pt encouraged to manage pain and to use the incentive spirometer. Pt educated on the side effects of medications taken. Pt left with call light within reach and bed in low position. Will continue to monitor. Shift summary - Pt pain managed with prn medication per MAR. Pt informed about all medications and side effects and encouraged to ask questions about medication. Pt encouraged throughout the night to use incentive spirometer and the purpose of the incentive spirometer. Pt left with no signs of distress and any concerns of pt addressed.

## 2021-01-05 ENCOUNTER — HOSPITAL ENCOUNTER (EMERGENCY)
Age: 66
Discharge: HOME | End: 2021-01-05
Attending: EMERGENCY MEDICINE
Payer: MEDICARE

## 2021-01-05 VITALS
BODY MASS INDEX: 36.32 KG/M2 | SYSTOLIC BLOOD PRESSURE: 135 MMHG | HEART RATE: 94 BPM | TEMPERATURE: 97.3 F | HEIGHT: 65 IN | DIASTOLIC BLOOD PRESSURE: 67 MMHG | WEIGHT: 218 LBS | OXYGEN SATURATION: 100 % | RESPIRATION RATE: 20 BRPM

## 2021-01-05 DIAGNOSIS — M54.42 ACUTE BILATERAL LOW BACK PAIN WITH LEFT-SIDED SCIATICA: Primary | ICD-10-CM

## 2021-01-05 PROCEDURE — 74011250636 HC RX REV CODE- 250/636: Performed by: PHYSICIAN ASSISTANT

## 2021-01-05 PROCEDURE — 99282 EMERGENCY DEPT VISIT SF MDM: CPT

## 2021-01-05 PROCEDURE — 96372 THER/PROPH/DIAG INJ SC/IM: CPT

## 2021-01-05 RX ORDER — HYDROCODONE BITARTRATE AND ACETAMINOPHEN 5; 325 MG/1; MG/1
1 TABLET ORAL
Qty: 5 TAB | Refills: 0 | Status: SHIPPED | OUTPATIENT
Start: 2021-01-05 | End: 2021-01-10

## 2021-01-05 RX ORDER — PREDNISONE 10 MG/1
TABLET ORAL
Qty: 48 TAB | Refills: 0 | Status: SHIPPED | OUTPATIENT
Start: 2021-01-05 | End: 2021-03-03

## 2021-01-05 RX ADMIN — METHYLPREDNISOLONE SODIUM SUCCINATE 125 MG: 125 INJECTION, POWDER, FOR SOLUTION INTRAMUSCULAR; INTRAVENOUS at 17:20

## 2021-01-05 NOTE — ED TRIAGE NOTES
Patient with complaints of lower back pain that radiates to the abdomen and down the left leg for 2 days

## 2021-01-05 NOTE — ED NOTES
I have reviewed discharge instructions with the patient. The patient verbalized understanding. Discharge medications reviewed with patient and appropriate educational materials and side effects teaching were provided. NAD. VSS. Ambulatory with steady gait. AOX4.

## 2021-01-05 NOTE — ED PROVIDER NOTES
EMERGENCY DEPARTMENT HISTORY AND PHYSICAL EXAM    Date: 1/5/2021  Patient Name: Destin Cam    History of Presenting Illness     Chief Complaint   Patient presents with    Back Pain         History Provided By: Patient    Chief Complaint: back pain       Additional History (Context):   4:41 PM  Randine Bertrand Meigs is a 63 y.o. female with PMHX back pain high cholesterol presents to the emergency department C/O acute exacerbation of chronic back pain for the past 2 days. Patient sees pain management and attempted to contact them today but they told her she needed to have an appointment but did not have anything available for January. Patient states the pain is in the lower back bilaterally but pain radiates into the left leg. She also has some left-sided abdominal pain which she says is all typical of her chronic back pain. No atypical's features signs or symptoms. She has had no nausea vomiting diarrhea urinary symptoms or fevers. Patient states she used to take Percocet but has not taken any in some time. She denies any groin numbness or incontinence. No new injury. PCP: Modesta, MD Al[facility]    Current Outpatient Medications   Medication Sig Dispense Refill    HYDROcodone-acetaminophen (Norco) 5-325 mg per tablet Take 1 Tab by mouth every six (6) hours as needed for Pain for up to 5 days. Max Daily Amount: 4 Tabs. 5 Tab 0    predniSONE (STERAPRED DS) 10 mg dose pack Take as directed 48 Tab 0    naloxone (NARCAN) 4 mg/actuation nasal spray Use 1 spray intranasally, then discard. Repeat with new spray every 2 min as needed for opioid overdose symptoms, alternating nostrils. 3 Each 0    allopurinol (ZYLOPRIM) 100 mg tablet Take 100 mg by mouth daily.  escitalopram oxalate (LEXAPRO) 10 mg tablet Take 10 mg by mouth daily.  pregabalin (LYRICA) 100 mg capsule Take 100 mg by mouth three (3) times daily.  hydroCHLOROthiazide (HYDRODIURIL) 12.5 mg tablet Take 12.5 mg by mouth daily.       meloxicam (MOBIC) 15 mg tablet Take 15 mg by mouth daily.  rosuvastatin (CRESTOR) 20 mg tablet Take 20 mg by mouth nightly.  fluticasone propionate (FLOVENT DISKUS) 50 mcg/actuation inhaler Take 2 Puffs by inhalation daily as needed.  polyvinyl alcohol-povidon,PF, (REFRESH CLASSIC) 1.4-0.6 % ophthalmic solution Administer 1-2 Drops to both eyes as needed.  amitriptyline (ELAVIL) 10 mg tablet Take 25 mg by mouth nightly. Past History     Past Medical History:  Past Medical History:   Diagnosis Date    Arthritis     Autoimmune disease ([facility] [address]     fibromyalgia    Chronic back pain     High cholesterol     Hypertension     Morbid obesity ([facility] [address]     Pain management     Psychiatric disorder        Past Surgical History:  Past Surgical History:   Procedure Laterality Date    HX BACK SURGERY      HX  SECTION      HX ORTHOPAEDIC      c 4 c5 l4 l5 sugery       Family History:  History reviewed. No pertinent family history. Social History:  Social History     Tobacco Use    Smoking status: Current Every Day Smoker     Packs/day: 0.25    Smokeless tobacco: Never Used    Tobacco comment: advised to stop smoking 24hrs before surgery   Substance Use Topics    Alcohol use: No    Drug use: No       Allergies: Allergies   Allergen Reactions    Latex Rash    Pcn [Penicillins] Unknown (comments)       Review of Systems   Review of Systems   Constitutional: Negative for chills and fever. Respiratory: Negative for shortness of breath. Cardiovascular: Negative for chest pain. Gastrointestinal: Positive for abdominal pain. Negative for diarrhea, nausea and vomiting. Musculoskeletal: Positive for back pain. Skin: Negative for rash. Neurological: Negative for weakness and numbness. All other systems reviewed and are negative.       Physical Exam     Vitals:    21 1629 21 1703   BP: 135/67    Pulse: 94    Resp: 20    Temp: 97.3 °F (36.3 °C)    SpO2: 100% 100% Weight: 98.9 kg (218 lb)    Height: 5' 5\" (1.651 m)      Physical Exam  Vitals signs and nursing note reviewed. Constitutional:       Appearance: She is well-developed. Comments: Appears uncomfortable, alert, oriented x4, no neuro deficits, able to ambulate in ED, non toxic   HENT:      Head: Normocephalic and atraumatic. Neck:      Musculoskeletal: Normal range of motion and neck supple. Cardiovascular:      Rate and Rhythm: Normal rate and regular rhythm. Heart sounds: Normal heart sounds. No murmur. Pulmonary:      Effort: Pulmonary effort is normal. No respiratory distress. Breath sounds: Normal breath sounds. No wheezing or rales. Abdominal:      General: Bowel sounds are normal. There is no distension. Palpations: Abdomen is soft. Tenderness: There is abdominal tenderness in the left lower quadrant. There is no right CVA tenderness, left CVA tenderness, guarding or rebound. Musculoskeletal:      Comments: low back TTP, decreased ROM secondary to pain, no crepitus or step off, no bruising swelling or skin changes  BLE: strength 5/5, pulses 2+ for DP and PT, brisk cap refill, sensation intact      Skin:     General: Skin is warm and dry. Findings: No rash. Neurological:      Mental Status: She is alert and oriented to person, place, and time. Sensory: No sensory deficit. Psychiatric:         Judgment: Judgment normal.         Diagnostic Study Results     Labs:   No results found for this or any previous visit (from the past 12 hour(s)). Radiologic Studies:   No orders to display     CT Results  (Last 48 hours)    None        CXR Results  (Last 48 hours)    None          Medical Decision Making   I am the first provider for this patient. I reviewed the vital signs, available nursing notes, past medical history, past surgical history, family history and social history. Vital Signs: Reviewed the patient's vital signs.     Pulse Oximetry Analysis: 100% on RA       Records Reviewed: Nursing Notes and Old Medical Records    Procedures:  Procedures    ED Course:   4:41 PM Initial assessment performed. The patients presenting problems have been discussed, and they are in agreement with the care plan formulated and outlined with them. I have encouraged them to ask questions as they arise throughout their visit. Discussion:  Pt presents with acute exacerbation of chronic lower back pain. Patient states it feels just like her usual exacerbations with no atypical signs or symptoms. States she has low back pain and some lower abdominal pain and pain that radiates into the left leg again all typical of her chronic back pain. She has no urinary symptoms. No red flag signs or symptoms. Patient is a chronic pain patient and was unable to get in with her pain doctor. We will give dose of a steroid in ED and start on prednisone taper. Will give 5 Norco but explained patient has to follow-up with her pain management doctor for any additional opiate pain medication strict return precautions given, pt offering no questions or complaints. Diagnosis and Disposition     DISCHARGE NOTE:  Ishaan Gutierres  results have been reviewed with her. She has been counseled regarding her diagnosis, treatment, and plan. She verbally conveys understanding and agreement of the signs, symptoms, diagnosis, treatment and prognosis and additionally agrees to follow up as discussed. She also agrees with the care-plan and conveys that all of her questions have been answered. I have also provided discharge instructions for her that include: educational information regarding their diagnosis and treatment, and list of reasons why they would want to return to the ED prior to their follow-up appointment, should her condition change. She has been provided with education for proper emergency department utilization. CLINICAL IMPRESSION:    1.  Acute bilateral low back pain with left-sided sciatica        PLAN:  1. D/C Home  2. Current Discharge Medication List      START taking these medications    Details   HYDROcodone-acetaminophen (Norco) 5-325 mg per tablet Take 1 Tab by mouth every six (6) hours as needed for Pain for up to 5 days. Max Daily Amount: 4 Tabs. Qty: 5 Tab, Refills: 0    Associated Diagnoses: Acute bilateral low back pain with left-sided sciatica      predniSONE (STERAPRED DS) 10 mg dose pack Take as directed  Qty: 48 Tab, Refills: 0           3. Follow-up Information    None                Please note that this dictation was completed with [employer], the computer voice recognition software. Quite often unanticipated grammatical, syntax, homophones, and other interpretive errors are inadvertently transcribed by the computer software. Please disregard these errors. Please excuse any errors that have escaped final proofreading.

## 2021-02-01 ENCOUNTER — TRANSCRIBE ORDER (OUTPATIENT)
Dept: SCHEDULING | Age: 66
End: 2021-02-01

## 2021-02-01 DIAGNOSIS — J44.9 CHRONIC OBSTRUCTIVE PULMONARY DISEASE (COPD) (HCC): ICD-10-CM

## 2021-02-01 DIAGNOSIS — F17.210 CIGARETTE NICOTINE DEPENDENCE, UNCOMPLICATED: Primary | ICD-10-CM

## 2021-02-01 DIAGNOSIS — Z72.0 TOBACCO USE: ICD-10-CM

## 2021-03-01 ENCOUNTER — APPOINTMENT (OUTPATIENT)
Dept: GENERAL RADIOLOGY | Age: 66
DRG: 556 | End: 2021-03-01
Attending: EMERGENCY MEDICINE
Payer: MEDICARE

## 2021-03-01 ENCOUNTER — HOSPITAL ENCOUNTER (OUTPATIENT)
Dept: CT IMAGING | Age: 66
Discharge: HOME | DRG: 556 | End: 2021-03-01
Attending: INTERNAL MEDICINE
Payer: MEDICARE

## 2021-03-01 ENCOUNTER — APPOINTMENT (OUTPATIENT)
Dept: CT IMAGING | Age: 66
DRG: 556 | End: 2021-03-01
Attending: EMERGENCY MEDICINE
Payer: MEDICARE

## 2021-03-01 ENCOUNTER — HOSPITAL ENCOUNTER (INPATIENT)
Age: 66
LOS: 1 days | Discharge: HOME | DRG: 556 | End: 2021-03-03
Attending: EMERGENCY MEDICINE | Admitting: HOSPITALIST
Payer: MEDICARE

## 2021-03-01 DIAGNOSIS — J44.9 CHRONIC OBSTRUCTIVE PULMONARY DISEASE (COPD) (HCC): ICD-10-CM

## 2021-03-01 DIAGNOSIS — R07.9 ACUTE CHEST PAIN: Primary | ICD-10-CM

## 2021-03-01 LAB
ALBUMIN SERPL-MCNC: 3.8 G/DL (ref 3.4–5)
ALBUMIN/GLOB SERPL: 0.9 {RATIO} (ref 0.8–1.7)
ALP SERPL-CCNC: 83 U/L (ref 45–117)
ALT SERPL-CCNC: 28 U/L (ref 13–56)
ANION GAP SERPL CALC-SCNC: 3 MMOL/L (ref 3–18)
AST SERPL-CCNC: 27 U/L (ref 10–38)
BASOPHILS # BLD: 0 K/UL (ref 0–0.1)
BASOPHILS NFR BLD: 0 % (ref 0–2)
BILIRUB SERPL-MCNC: 0.4 MG/DL (ref 0.2–1)
BUN SERPL-MCNC: 9 MG/DL (ref 7–18)
BUN/CREAT SERPL: 9 (ref 12–20)
CALCIUM SERPL-MCNC: 9.5 MG/DL (ref 8.5–10.1)
CHLORIDE SERPL-SCNC: 107 MMOL/L (ref 100–111)
CK MB CFR SERPL CALC: 0.5 % (ref 0–4)
CK MB CFR SERPL CALC: 0.5 % (ref 0–4)
CK MB SERPL-MCNC: 1.5 NG/ML (ref 5–25)
CK MB SERPL-MCNC: 1.6 NG/ML (ref 5–25)
CK SERPL-CCNC: 280 U/L (ref 26–192)
CK SERPL-CCNC: 345 U/L (ref 26–192)
CO2 SERPL-SCNC: 28 MMOL/L (ref 21–32)
CREAT SERPL-MCNC: 0.98 MG/DL (ref 0.6–1.3)
D DIMER PPP FEU-MCNC: 1.04 UG/ML(FEU)
DIFFERENTIAL METHOD BLD: ABNORMAL
EOSINOPHIL # BLD: 0.1 K/UL (ref 0–0.4)
EOSINOPHIL NFR BLD: 2 % (ref 0–5)
ERYTHROCYTE [DISTWIDTH] IN BLOOD BY AUTOMATED COUNT: 13.5 % (ref 11.6–14.5)
GLOBULIN SER CALC-MCNC: 4.3 G/DL (ref 2–4)
GLUCOSE SERPL-MCNC: 91 MG/DL (ref 74–99)
HCT VFR BLD AUTO: 41.6 % (ref 35–45)
HGB BLD-MCNC: 13.5 G/DL (ref 12–16)
LYMPHOCYTES # BLD: 3.4 K/UL (ref 0.9–3.6)
LYMPHOCYTES NFR BLD: 52 % (ref 21–52)
MCH RBC QN AUTO: 30.7 PG (ref 24–34)
MCHC RBC AUTO-ENTMCNC: 32.5 G/DL (ref 31–37)
MCV RBC AUTO: 94.5 FL (ref 74–97)
MONOCYTES # BLD: 0.9 K/UL (ref 0.05–1.2)
MONOCYTES NFR BLD: 15 % (ref 3–10)
NEUTS SEG # BLD: 2 K/UL (ref 1.8–8)
NEUTS SEG NFR BLD: 31 % (ref 40–73)
PLATELET # BLD AUTO: 362 K/UL (ref 135–420)
PMV BLD AUTO: 9.7 FL (ref 9.2–11.8)
POTASSIUM SERPL-SCNC: 4 MMOL/L (ref 3.5–5.5)
PROT SERPL-MCNC: 8.1 G/DL (ref 6.4–8.2)
RBC # BLD AUTO: 4.4 M/UL (ref 4.2–5.3)
SODIUM SERPL-SCNC: 138 MMOL/L (ref 136–145)
TROPONIN I SERPL-MCNC: <0.02 NG/ML (ref 0–0.04)
TROPONIN I SERPL-MCNC: <0.02 NG/ML (ref 0–0.04)
WBC # BLD AUTO: 6.4 K/UL (ref 4.6–13.2)

## 2021-03-01 PROCEDURE — 74011250636 HC RX REV CODE- 250/636: Performed by: HOSPITALIST

## 2021-03-01 PROCEDURE — 85379 FIBRIN DEGRADATION QUANT: CPT

## 2021-03-01 PROCEDURE — 99285 EMERGENCY DEPT VISIT HI MDM: CPT

## 2021-03-01 PROCEDURE — 74011000636 HC RX REV CODE- 636: Performed by: EMERGENCY MEDICINE

## 2021-03-01 PROCEDURE — 74011250637 HC RX REV CODE- 250/637: Performed by: HOSPITALIST

## 2021-03-01 PROCEDURE — 99218 HC RM OBSERVATION: CPT

## 2021-03-01 PROCEDURE — 71250 CT THORAX DX C-: CPT

## 2021-03-01 PROCEDURE — 94761 N-INVAS EAR/PLS OXIMETRY MLT: CPT

## 2021-03-01 PROCEDURE — 71275 CT ANGIOGRAPHY CHEST: CPT

## 2021-03-01 PROCEDURE — 71045 X-RAY EXAM CHEST 1 VIEW: CPT

## 2021-03-01 PROCEDURE — 80053 COMPREHEN METABOLIC PANEL: CPT

## 2021-03-01 PROCEDURE — 84484 ASSAY OF TROPONIN QUANT: CPT

## 2021-03-01 PROCEDURE — 36415 COLL VENOUS BLD VENIPUNCTURE: CPT

## 2021-03-01 PROCEDURE — 96372 THER/PROPH/DIAG INJ SC/IM: CPT

## 2021-03-01 PROCEDURE — 85025 COMPLETE CBC W/AUTO DIFF WBC: CPT

## 2021-03-01 PROCEDURE — 93005 ELECTROCARDIOGRAM TRACING: CPT

## 2021-03-01 PROCEDURE — 96374 THER/PROPH/DIAG INJ IV PUSH: CPT

## 2021-03-01 RX ORDER — ROSUVASTATIN CALCIUM 10 MG/1
20 TABLET, COATED ORAL
Status: DISCONTINUED | OUTPATIENT
Start: 2021-03-01 | End: 2021-03-03 | Stop reason: HOSPADM

## 2021-03-01 RX ORDER — GUAIFENESIN 100 MG/5ML
81 LIQUID (ML) ORAL DAILY
Status: DISCONTINUED | OUTPATIENT
Start: 2021-03-02 | End: 2021-03-03 | Stop reason: HOSPADM

## 2021-03-01 RX ORDER — ALLOPURINOL 100 MG/1
100 TABLET ORAL DAILY
Status: DISCONTINUED | OUTPATIENT
Start: 2021-03-02 | End: 2021-03-03 | Stop reason: HOSPADM

## 2021-03-01 RX ORDER — PREGABALIN 100 MG/1
100 CAPSULE ORAL 3 TIMES DAILY
Status: DISCONTINUED | OUTPATIENT
Start: 2021-03-01 | End: 2021-03-03 | Stop reason: HOSPADM

## 2021-03-01 RX ORDER — SODIUM CHLORIDE 9 MG/ML
50 INJECTION, SOLUTION INTRAVENOUS CONTINUOUS
Status: DISPENSED | OUTPATIENT
Start: 2021-03-01 | End: 2021-03-02

## 2021-03-01 RX ORDER — AMITRIPTYLINE HYDROCHLORIDE 50 MG/1
25 TABLET, FILM COATED ORAL
Status: DISCONTINUED | OUTPATIENT
Start: 2021-03-01 | End: 2021-03-03 | Stop reason: HOSPADM

## 2021-03-01 RX ORDER — HEPARIN SODIUM 5000 [USP'U]/ML
5000 INJECTION, SOLUTION INTRAVENOUS; SUBCUTANEOUS EVERY 8 HOURS
Status: DISCONTINUED | OUTPATIENT
Start: 2021-03-01 | End: 2021-03-03 | Stop reason: HOSPADM

## 2021-03-01 RX ORDER — ACETAMINOPHEN 325 MG/1
650 TABLET ORAL
Status: DISCONTINUED | OUTPATIENT
Start: 2021-03-01 | End: 2021-03-03 | Stop reason: HOSPADM

## 2021-03-01 RX ORDER — NITROGLYCERIN 0.4 MG/1
0.4 TABLET SUBLINGUAL
Status: DISCONTINUED | OUTPATIENT
Start: 2021-03-01 | End: 2021-03-03 | Stop reason: HOSPADM

## 2021-03-01 RX ORDER — HYDROCHLOROTHIAZIDE 25 MG/1
12.5 TABLET ORAL DAILY
Status: DISCONTINUED | OUTPATIENT
Start: 2021-03-02 | End: 2021-03-03 | Stop reason: HOSPADM

## 2021-03-01 RX ORDER — MORPHINE SULFATE 2 MG/ML
1 INJECTION, SOLUTION INTRAMUSCULAR; INTRAVENOUS
Status: DISCONTINUED | OUTPATIENT
Start: 2021-03-01 | End: 2021-03-03 | Stop reason: HOSPADM

## 2021-03-01 RX ADMIN — ACETAMINOPHEN 650 MG: 325 TABLET, FILM COATED ORAL at 19:57

## 2021-03-01 RX ADMIN — HEPARIN SODIUM 5000 UNITS: 5000 INJECTION INTRAVENOUS; SUBCUTANEOUS at 18:06

## 2021-03-01 RX ADMIN — SODIUM CHLORIDE 50 ML/HR: 900 INJECTION, SOLUTION INTRAVENOUS at 19:58

## 2021-03-01 RX ADMIN — PREGABALIN 100 MG: 100 CAPSULE ORAL at 21:08

## 2021-03-01 RX ADMIN — IOPAMIDOL 100 ML: 755 INJECTION, SOLUTION INTRAVENOUS at 15:08

## 2021-03-01 RX ADMIN — AMITRIPTYLINE HYDROCHLORIDE 25 MG: 50 TABLET, FILM COATED ORAL at 21:08

## 2021-03-01 RX ADMIN — ROSUVASTATIN CALCIUM 20 MG: 10 TABLET, COATED ORAL at 21:08

## 2021-03-01 RX ADMIN — MORPHINE SULFATE 1 MG: 2 INJECTION, SOLUTION INTRAMUSCULAR; INTRAVENOUS at 21:14

## 2021-03-01 NOTE — CONSULTS
[facility] Consult Note      Patient: Sanudra Reeder MRN: [id number]  SSN: [id number]    YOB: 1955  Age: 63 y.o. Sex: female          Date of Consultation: 3/1/2021  Referring Physician: Dr. Noah Trinidad  Reason for Consultation: chest pain    HPI:  I was asked by Dr. Noah Trinidad for chest pain. Saundra Reeder is a 26-year-old patient came to the hospital with intermittent chest pain arm pain arthritic pain and anxiety from 1 to 2 weeks with multiple risk factor. Her past medical history is significant for hypertension, hyperlipidemia, obesity, fibromyalgia, arthritis. According to the patient she is under a lot of stress because of her daughter who is living with her she has fleeting arthritis and chest pain with or without any limitation in normal activities. Denied any orthopnea, PND or ankle swelling. Patient have not have recent ischemic coronary testing.     Past Medical History:   Diagnosis Date    Arthritis     Autoimmune disease ([facility] [address]     fibromyalgia    Chronic back pain     High cholesterol     Hypertension     Morbid obesity ([facility] [address]     Pain management     Psychiatric disorder      Past Surgical History:   Procedure Laterality Date    HX BACK SURGERY      HX  SECTION      HX ORTHOPAEDIC      c 4 c5 l4 l5 sugery     Current Facility-Administered Medications   Medication Dose Route Frequency    [START ON 3/2/2021] aspirin chewable tablet 81 mg  81 mg Oral DAILY    nitroglycerin (NITROSTAT) tablet 0.4 mg  0.4 mg SubLINGual Q5MIN PRN    acetaminophen (TYLENOL) tablet 650 mg  650 mg Oral Q4H PRN    morphine injection 1 mg  1 mg IntraVENous Q4H PRN    heparin (porcine) injection 5,000 Units  5,000 Units SubCUTAneous Q8H    amitriptyline (ELAVIL) tablet 25 mg  25 mg Oral QHS    [START ON 3/2/2021] allopurinoL (ZYLOPRIM) tablet 100 mg  100 mg Oral DAILY    pregabalin (LYRICA) capsule 100 mg  100 mg Oral TID    rosuvastatin (CRESTOR) tablet 20 mg  20 mg Oral QHS    [START ON 3/2/2021] hydroCHLOROthiazide (HYDRODIURIL) tablet 12.5 mg  12.5 mg Oral DAILY    0.9% sodium chloride infusion  50 mL/hr IntraVENous CONTINUOUS       Allergies and Intolerances: Allergies   Allergen Reactions    Latex Rash    Pcn [Penicillins] Unknown (comments)       Family History:   No family history on file. Social History:   She  reports that she has been smoking. She has been smoking about 0.25 packs per day. She has never used smokeless tobacco.  She  reports no history of alcohol use. Review of Systems:     Review of Systems  Gen: No fever, chills, malaise, weight loss/gain. Heent: No headache, rhinorrhea, epistaxis, ear pain, hearing loss, sinus pain, neck pain/stiffness, sore throat. Heart: + chest pain, palpitations, BAINS, pnd, or orthopnea. Resp: No cough, hemoptysis, wheezing and shortness of breath. GI: No nausea, vomiting, diarrhea, constipation, melena or hematochezia. : No urinary obstruction, dysuria or hematuria. Derm: No rash, new skin lesion or pruritis. Musc/skeletal: + bone  And + joint complains. Vasc: No edema, cyanosis or claudication. Endo: No heat/cold intolerance, no polyuria,polydipsia or polyphagia. Neuro: No unilateral weakness, numbness, tingling. No seizures. Heme: No easy bruising or bleeding. Physical:   Patient Vitals for the past 6 hrs:   Temp Pulse Resp BP SpO2   03/01/21 1829 98.2 °F (36.8 °C) 87 16 125/76 100 %   03/01/21 1738 97.7 °F (36.5 °C) 95 16 138/84 98 %   03/01/21 1658  90 17  99 %   03/01/21 1600  91 15 (!) 148/80 98 %   03/01/21 1530  93 16 (!) 156/73 97 %   03/01/21 1525    (!) 152/85 97 %   03/01/21 1430  93 12 122/72 95 %   03/01/21 1400  96 15 129/81 99 %   03/01/21 1331 97.5 °F (36.4 °C) 100 23 138/82 100 %         Exam:   General Appearance: Comfortable, not using accessory muscles of respiration. HEENT: MELLISA. HEAD: Atraumatic  NECK: No JVD, no thyroidomeglay. LUNGS: Clear bilaterally.    HEART: S1+S2 ABD: Non-tender, BS Audible    EXT: No edema, and no cysnosis. VASCULAR EXAM: Pulses are intact. PSYCHIATRIC EXAM: Mood is appropriate. MUSCULOSKELETAL: Grossly no joint deformity. NEUROLOGICAL: Motor and sensory sytem intact and Cranial nerves II-XII intact. Review of Data:   LABS:   Lab Results   Component Value Date/Time    WBC 6.4 03/01/2021 01:45 PM    HGB 13.5 03/01/2021 01:45 PM    HCT 41.6 03/01/2021 01:45 PM    PLATELET 764 75/27/8471 01:45 PM     Lab Results   Component Value Date/Time    Sodium 138 03/01/2021 01:45 PM    Potassium 4.0 03/01/2021 01:45 PM    Chloride 107 03/01/2021 01:45 PM    CO2 28 03/01/2021 01:45 PM    Glucose 91 03/01/2021 01:45 PM    BUN 9 03/01/2021 01:45 PM    Creatinine 0.98 03/01/2021 01:45 PM     No results found for: CHOL, CHOLX, CHLST, CHOLV, HDL, HDLP, LDL, LDLC, DLDLP, TGLX, TRIGL, TRIGP  No components found for: GPT  Lab Results   Component Value Date/Time    Hemoglobin A1c 6.0 (H) 10/04/2019 08:07 AM       RADIOLOGY:  CT Results  (Last 48 hours)               03/01/21 1519  CTA CHEST W OR W WO CONT Final result    Impression:      1. No evidence of pulmonary thromboembolic disease. 2.  No infiltrate or other acute pulmonary abnormality. Narrative:  EXAM: CTA CHEST W OR W WO CONT       CLINICAL INDICATION/HISTORY: Chest pain       COMPARISON: Chest radiograph same day       TECHNIQUE: Thin section CT was performed through the chest during pulmonary   arterial enhancement. MIP 3D reconstructions were generated to increase   sensitivity in the detection of pulmonary emboli. One or more dose reduction   techniques were used on this CT: automated exposure control, adjustment of the   mAs and/or kVp according to patient size, and iterative reconstruction   techniques.   The specific techniques used on this CT exam have been documented   in the patient's electronic medical record. Digital Imaging and Communications   in Medicine (DICOM) format image data are available to nonaffiliated external   healthcare facilities or entities on a secure, media free, reciprocally   searchable basis with patient authorization for at least a 12-month period after   this study. --- EXAM QUALITY ---       1. Overall exam quality- satisfactory: adequate   2. Adequacy of pulmonary enhancement-adequate: sufficient enhancement for   diagnosis down to and including subsegmental vessels. Main PA density 190-250   HU   3. Adequacy of breath hold- adequate: sufficient enough to render diagnosis    4. There are no significant noise, beam hardening, streak artifacts affecting   scan quality. _______________       FINDINGS:       ---  PULMONARY CT ANGIOGRAM  ---       PULMONARY VASCULATURE: The right and left central, primary segmental and   subsegmental pulmonary arteries appear patent. There is no convincing evidence   of intraluminal filling defect identified to suggest pulmonary embolism. THORACIC AORTA: Normal caliber thoracic aorta. No aortic aneurysm, intramural   hematoma or dissection. ---  CT CHEST ---       LUNG PARENCHYMA:  There is mild bibasilar dependent atelectasis. The lung   parenchyma appears otherwise clear. No pulmonary infiltration or consolidation    identified. No pulmonary nodule or mass identified. PLEURAL SPACES:   No pleural effusion or pneumothorax. MEDIASTINUM: Global thyromegaly. No thoracic lymphadenopathy. No global   cardiomegaly. No pericardial effusion. OSSEOUS STRUCTURES:   No acute osseous abnormality. Mild thoracic degenerative   disk disease. UPPER ABDOMEN: No acute abnormality. Small hiatal hernia. Diffuse hepatic   steatosis.        _______________               CXR Results  (Last 48 hours)               03/01/21 1353  XR CHEST PORT Final result    Impression:      No plain film evidence of acute cardiopulmonary disease, allowing for technique. Narrative:  HISTORY:    -Provided with order: chest pain   -Additional: None       Technique : AP PORTABLE CHEST       Comparison : None. FINDINGS:       HEART AND MEDIASTINUM: Unremarkable. LUNGS AND PLEURAL SPACES: No consolidation, congestive heart failure, pleural   effusion or pneumothorax. BONY THORAX AND SOFT TISSUES: No acute osseous abnormality. Cardiology Procedures:   Results for orders placed or performed during the hospital encounter of 03/01/21   EKG, 12 LEAD, INITIAL   Result Value Ref Range    Ventricular Rate 99 BPM    Atrial Rate 99 BPM    P-R Interval 138 ms    QRS Duration 84 ms    Q-T Interval 364 ms    QTC Calculation (Bezet) 467 ms    Calculated P Axis 62 degrees    Calculated R Axis 41 degrees    Calculated T Axis 61 degrees    Diagnosis       Normal sinus rhythm  Normal ECG  When compared with ECG of 04-OCT-2019 07:52,  No significant change was found        Echo Results  (Last 48 hours)    None       Cardiolite (Tc-99m Sestamibi) stress test        Impression / Plan:    Patient Active Problem List   Diagnosis Code    Osteoarthritis of right knee M17.11    Chest pain R07.9    High cholesterol E78.00    Hypertension I10    Psychiatric disorder F99    Morbid obesity (HCC) E66.01     Chest pain with risk factor-mixed chest pain features including atypical and typical  Hypertension  Hyperlipidemia  Anxiety  Fibromyalgia and history of arthritis        Plan. Check serial cardiac panel  rule out ACS  Will get echocardiogram and if ACS ruled out then will do stress test tomorrow. Management plan was discussed with the patient.           Signed By: Kathi Kilgore MD     March 1, 2021

## 2021-03-01 NOTE — H&P
History & Physical    Patient: Deidre Noel MRN: [id number]  CSN: [id number]    YOB: 1955  Age: 63 y.o. Sex: female      DOA: 3/1/2021  Primary Care Provider:  Remigio Marques DO      Assessment/Plan     Hospital Problems  Date Reviewed: 10/30/2019          Codes Class Noted POA    * (Principal) Chest pain ICD-10-CM: R07.9  ICD-9-CM: 786.50  3/1/2021 Unknown        High cholesterol ICD-10-CM: E78.00  ICD-9-CM: 272.0  Unknown Unknown        Hypertension ICD-10-CM: I10  ICD-9-CM: 401.9  Unknown Unknown        Morbid obesity ([facility] [address] ICD-10-CM: E66.01  ICD-9-CM: 278.01  Unknown Unknown                Admit to tele for obs     Chest pain  Cardiac monitor, ce trend need to r/o acs   Ekg: no st change at this point,   morphine /nitro prn for chest pain  Cardiology consult   Need cath vs stress test will defer to cardiology     Hypertension  Continue home medication    Hyperlipidemia  Continue statin    Morbid obesity  Lifestyle modification    Fibromyalgia  Follow-up chronic pain clinic  Continue muscle relaxant and Lyrica      Full code      DVT : heparin   CC: Chest pain       HPI:     Deidre Noel is a 63 y.o. female with hypertension, hyperlipidemia fibromyalgia, smoker presented to ER due to chest pain. It is started from her right shoulder, down to her right chest, also involved right arm. She visited her primary care physician recently, she is supposed to have scheduled a stress test today. Denies any shortness of breath. She has a fibromyalgia, follow-up with pain specialist at [facility]. Reported pain in  back / hip /knee.   ce wnl      was at the bedside  Visit Vitals  /84 (BP 1 Location: Left upper arm, BP Patient Position: At rest;Sitting)   Pulse 95   Temp 97.7 °F (36.5 °C)   Resp 16   Ht 5' 5\" (1.651 m)   Wt 99.3 kg (219 lb)   SpO2 98%   BMI 36.44 kg/m²      O2 Device: Room air      Past Medical History:   Diagnosis Date    Arthritis     Autoimmune disease ([facility] [address] fibromyalgia    Chronic back pain     High cholesterol     Hypertension     Morbid obesity ([facility] [address]     Pain management     Psychiatric disorder        Past Surgical History:   Procedure Laterality Date    HX BACK SURGERY      HX  SECTION      HX ORTHOPAEDIC      c 4 c5 l4 l5 sugery     Family History    Medical History Relation Name Comments   Alcohol abuse Brother       Drug Abuse Brother         Relation Name Status Comments   Brother         Brother             Social History     Socioeconomic History    Marital status:      Spouse name: Not on file    Number of children: Not on file    Years of education: Not on file    Highest education level: Not on file   Tobacco Use    Smoking status: Current Every Day Smoker     Packs/day: 0.25    Smokeless tobacco: Never Used    Tobacco comment: advised to stop smoking 24hrs before surgery   Substance and Sexual Activity    Alcohol use: No    Drug use: No       Prior to Admission medications    Medication Sig Start Date End Date Taking? Authorizing Provider   predniSONE (STERAPRED DS) 10 mg dose pack Take as directed 21   Sophy Heart PA   naloxone [facility]) 4 mg/actuation nasal spray Use 1 spray intranasally, then discard. Repeat with new spray every 2 min as needed for opioid overdose symptoms, alternating nostrils. 10/31/19   Nancy Esposito PA-C   allopurinol (ZYLOPRIM) 100 mg tablet Take 100 mg by mouth daily. Provider, Historical   escitalopram oxalate (LEXAPRO) 10 mg tablet Take 10 mg by mouth daily. Provider, Historical   pregabalin (LYRICA) 100 mg capsule Take 100 mg by mouth three (3) times daily. Provider, Historical   hydroCHLOROthiazide (HYDRODIURIL) 12.5 mg tablet Take 12.5 mg by mouth daily. Provider, Historical   meloxicam (MOBIC) 15 mg tablet Take 15 mg by mouth daily. Provider, Historical   rosuvastatin (CRESTOR) 20 mg tablet Take 20 mg by mouth nightly.     Provider, Historical   fluticasone propionate (FLOVENT DISKUS) 50 mcg/actuation inhaler Take 2 Puffs by inhalation daily as needed. Provider, Historical   polyvinyl alcohol-povidon,PF, (REFRESH CLASSIC) 1.4-0.6 % ophthalmic solution Administer 1-2 Drops to both eyes as needed. Provider, Historical   amitriptyline (ELAVIL) 10 mg tablet Take 25 mg by mouth nightly. Other, MD Alize       Allergies   Allergen Reactions    Latex Rash    Pcn [Penicillins] Unknown (comments)       Review of Systems  Gen: No fever, chills, malaise, weight loss/gain. Heent: No headache, rhinorrhea, epistaxis, ear pain, hearing loss, sinus pain, neck pain/stiffness, sore throat. Heart: + chest pain, no  palpitations, BAINS, pnd, or orthopnea. Resp: No cough, hemoptysis, wheezing and shortness of breath. GI: No nausea, vomiting, diarrhea, constipation, melena or hematochezia. : No urinary obstruction, dysuria or hematuria. Derm: No rash, new skin lesion or pruritis. Musc/skeletal: pain in back, knee, hip  Vasc: No edema, cyanosis or claudication. Endo: No heat/cold intolerance, no polyuria,polydipsia or polyphagia. Neuro: No unilateral weakness, numbness, tingling. No seizures. Heme: No easy bruising or bleeding. Physical Exam:     Physical Exam:  Visit Vitals  /84 (BP 1 Location: Left upper arm, BP Patient Position: At rest;Sitting)   Pulse 95   Temp 97.7 °F (36.5 °C)   Resp 16   Ht 5' 5\" (1.651 m)   Wt 99.3 kg (219 lb)   SpO2 98%   BMI 36.44 kg/m²      O2 Device: Room air    Temp (24hrs), Av.6 °F (36.4 °C), Min:97.5 °F (36.4 °C), Max:97.7 °F (36.5 °C)    No intake/output data recorded. No intake/output data recorded. General:  Awake, cooperative, no distress. Head:  Normocephalic, without obvious abnormality, atraumatic. Eyes:  Conjunctivae/corneas clear, sclera anicteric, PERRL, EOMs intact. Nose: Nares normal. No drainage or sinus tenderness.    Throat: Lips, mucosa, and tongue normal. .   Neck: Supple, symmetrical, trachea midline, no adenopathy. Lungs:   Clear to auscultation bilaterally. Heart:  Regular rate and rhythm, S1, S2 normal, no murmur, click, rub or gallop. Abdomen: Soft, non-tender. Bowel sounds normal. No masses,  No organomegaly. Extremities: Extremities normal, atraumatic, no cyanosis or edema. Pulses: 2+ and symmetric all extremities. Skin: Skin color-pink, texture, turgor normal. No rashes or lesions. Capillary refill normal  Multiple point tenderness    Neurologic: CNII-XII intact. No focal motor or sensory deficit.        Labs Reviewed:    BMP:   Lab Results   Component Value Date/Time     03/01/2021 01:45 PM    K 4.0 03/01/2021 01:45 PM     03/01/2021 01:45 PM    CO2 28 03/01/2021 01:45 PM    AGAP 3 03/01/2021 01:45 PM    GLU 91 03/01/2021 01:45 PM    BUN 9 03/01/2021 01:45 PM    CREA 0.98 03/01/2021 01:45 PM    GFRAA >60 03/01/2021 01:45 PM    GFRNA 57 (L) 03/01/2021 01:45 PM     CMP:   Lab Results   Component Value Date/Time     03/01/2021 01:45 PM    K 4.0 03/01/2021 01:45 PM     03/01/2021 01:45 PM    CO2 28 03/01/2021 01:45 PM    AGAP 3 03/01/2021 01:45 PM    GLU 91 03/01/2021 01:45 PM    BUN 9 03/01/2021 01:45 PM    CREA 0.98 03/01/2021 01:45 PM    GFRAA >60 03/01/2021 01:45 PM    GFRNA 57 (L) 03/01/2021 01:45 PM    CA 9.5 03/01/2021 01:45 PM    ALB 3.8 03/01/2021 01:45 PM    TP 8.1 03/01/2021 01:45 PM    GLOB 4.3 (H) 03/01/2021 01:45 PM    AGRAT 0.9 03/01/2021 01:45 PM    ALT 28 03/01/2021 01:45 PM     CBC:   Lab Results   Component Value Date/Time    WBC 6.4 03/01/2021 01:45 PM    HGB 13.5 03/01/2021 01:45 PM    HCT 41.6 03/01/2021 01:45 PM     03/01/2021 01:45 PM     All Cardiac Markers in the last 24 hours:   Lab Results   Component Value Date/Time     (H) 03/01/2021 01:45 PM    CKMB 1.6 03/01/2021 01:45 PM    CKND1 0.5 03/01/2021 01:45 PM    TROIQ <0.02 03/01/2021 01:45 PM     Recent Glucose Results:   Lab Results Component Value Date/Time    GLU 91 03/01/2021 01:45 PM     ABG: No results found for: PH, PHI, PCO2, PCO2I, PO2, PO2I, HCO3, HCO3I, FIO2, FIO2I  COAGS: No results found for: APTT, PTP, INR, INREXT, INREXT  Liver Panel:   Lab Results   Component Value Date/Time    ALB 3.8 03/01/2021 01:45 PM    TP 8.1 03/01/2021 01:45 PM    GLOB 4.3 (H) 03/01/2021 01:45 PM    AGRAT 0.9 03/01/2021 01:45 PM    ALT 28 03/01/2021 01:45 PM    AP 83 03/01/2021 01:45 PM     Pancreatic Markers: No results found for: AMYLPOCT, AML, LIPPOCT, LPSE    Cta Chest W Or W Wo Cont    Result Date: 3/1/2021  EXAM: CTA CHEST W OR W WO CONT CLINICAL INDICATION/HISTORY: Chest pain COMPARISON: Chest radiograph same day TECHNIQUE: Thin section CT was performed through the chest during pulmonary arterial enhancement. MIP 3D reconstructions were generated to increase sensitivity in the detection of pulmonary emboli. One or more dose reduction techniques were used on this CT: automated exposure control, adjustment of the mAs and/or kVp according to patient size, and iterative reconstruction techniques. The specific techniques used on this CT exam have been documented in the patient's electronic medical record. Digital Imaging and Communications in Medicine (DICOM) format image data are available to nonaffiliated external healthcare facilities or entities on a secure, media free, reciprocally searchable basis with patient authorization for at least a 12-month period after this study. --- EXAM QUALITY --- 1. Overall exam quality- satisfactory: adequate 2. Adequacy of pulmonary enhancement-adequate: sufficient enhancement for diagnosis down to and including subsegmental vessels. Main PA density 190-250 HU 3. Adequacy of breath hold- adequate: sufficient enough to render diagnosis 4. There are no significant noise, beam hardening, streak artifacts affecting scan quality.  _______________ FINDINGS: ---  PULMONARY CT ANGIOGRAM  --- PULMONARY VASCULATURE: The right and left central, primary segmental and subsegmental pulmonary arteries appear patent. There is no convincing evidence of intraluminal filling defect identified to suggest pulmonary embolism. THORACIC AORTA: Normal caliber thoracic aorta. No aortic aneurysm, intramural hematoma or dissection. ---  CT CHEST --- LUNG PARENCHYMA:  There is mild bibasilar dependent atelectasis. The lung parenchyma appears otherwise clear. No pulmonary infiltration or consolidation identified. No pulmonary nodule or mass identified. PLEURAL SPACES:   No pleural effusion or pneumothorax. MEDIASTINUM: Global thyromegaly. No thoracic lymphadenopathy. No global cardiomegaly. No pericardial effusion. OSSEOUS STRUCTURES:   No acute osseous abnormality. Mild thoracic degenerative disk disease. UPPER ABDOMEN: No acute abnormality. Small hiatal hernia. Diffuse hepatic steatosis. _______________     1. No evidence of pulmonary thromboembolic disease. 2.  No infiltrate or other acute pulmonary abnormality. Xr Chest Port    Result Date: 3/1/2021  HISTORY: -Provided with order: chest pain -Additional: None Technique : AP PORTABLE CHEST Comparison : None. FINDINGS: HEART AND MEDIASTINUM: Unremarkable. LUNGS AND PLEURAL SPACES: No consolidation, congestive heart failure, pleural effusion or pneumothorax. BONY THORAX AND SOFT TISSUES: No acute osseous abnormality. No plain film evidence of acute cardiopulmonary disease, allowing for technique.      Procedures/imaging: see electronic medical records for all procedures/Xrays and details which were not copied into this note but were reviewed prior to creation of Moises Correia MD, Internal Medicine     CC: Cathy Cornell DO

## 2021-03-01 NOTE — ED PROVIDER NOTES
EMERGENCY DEPARTMENT HISTORY AND PHYSICAL EXAM    Date: 3/1/2021  Patient Name: Deven Delong    History of Presenting Illness     Chief Complaint   Patient presents with    Muscle Pain    Chest Pain         History Provided By: Patient    1:37 PM  Deven Delong is a 63 y.o. female with PMHX of hypertension, high cholesterol, fibromyalgia, active tobacco smoker who presents to the emergency department C/O right shoulder pain, left low back pain, chest pain, shortness of breath. Per patient the symptoms started yesterday but became severe last night. She reports the pain has eased up but she still is a 3 out of 10 pain in the right side of her chest that she describes as a \"pain\". She states that shortness of breath and chest pain are worse when she exerts herself and better with rest.  She denies any fever, cough, vomiting, bowel or urinary complaints. No known sick contacts or recent travel. She does report significant family history of heart disease. She reports she is scheduled for stress test next week. PCP: Modesta, MD Alize    Current Outpatient Medications   Medication Sig Dispense Refill    predniSONE (STERAPRED DS) 10 mg dose pack Take as directed 48 Tab 0    naloxone (NARCAN) 4 mg/actuation nasal spray Use 1 spray intranasally, then discard. Repeat with new spray every 2 min as needed for opioid overdose symptoms, alternating nostrils. 3 Each 0    allopurinol (ZYLOPRIM) 100 mg tablet Take 100 mg by mouth daily.  escitalopram oxalate (LEXAPRO) 10 mg tablet Take 10 mg by mouth daily.  pregabalin (LYRICA) 100 mg capsule Take 100 mg by mouth three (3) times daily.  hydroCHLOROthiazide (HYDRODIURIL) 12.5 mg tablet Take 12.5 mg by mouth daily.  meloxicam (MOBIC) 15 mg tablet Take 15 mg by mouth daily.  rosuvastatin (CRESTOR) 20 mg tablet Take 20 mg by mouth nightly.       fluticasone propionate (FLOVENT DISKUS) 50 mcg/actuation inhaler Take 2 Puffs by inhalation daily as needed.  polyvinyl alcohol-povidon,PF, (REFRESH CLASSIC) 1.4-0.6 % ophthalmic solution Administer 1-2 Drops to both eyes as needed.  amitriptyline (ELAVIL) 10 mg tablet Take 25 mg by mouth nightly. Past History     Past Medical History:  Past Medical History:   Diagnosis Date    Arthritis     Autoimmune disease ([facility] [address]     fibromyalgia    Chronic back pain     High cholesterol     Hypertension     Morbid obesity ([facility] [address]     Pain management     Psychiatric disorder        Past Surgical History:  Past Surgical History:   Procedure Laterality Date    HX BACK SURGERY      HX  SECTION      HX ORTHOPAEDIC      c 4 c5 l4 l5 sugery       Family History:  No family history on file. Social History:  Social History     Tobacco Use    Smoking status: Current Every Day Smoker     Packs/day: 0.25    Smokeless tobacco: Never Used    Tobacco comment: advised to stop smoking 24hrs before surgery   Substance Use Topics    Alcohol use: No    Drug use: No       Allergies: Allergies   Allergen Reactions    Latex Rash    Pcn [Penicillins] Unknown (comments)         Review of Systems   Review of Systems   Constitutional: Negative for fever. Respiratory: Positive for shortness of breath. Cardiovascular: Positive for chest pain. Gastrointestinal: Negative for abdominal pain. Musculoskeletal: Positive for arthralgias, back pain and myalgias. All other systems reviewed and are negative.         Physical Exam     Vitals:    21 1331   BP: 138/82   Pulse: 100   Resp: 23   Temp: 97.5 °F (36.4 °C)   SpO2: 100%   Weight: 99.3 kg (219 lb)   Height: 5' 5\" (1.651 m)     Physical Exam    Nursing notes and vital signs reviewed    Constitutional: Non toxic appearing, moderate distress  Head: Normocephalic, Atraumatic  Eyes: EOMI  Neck: Supple  Cardiovascular: Regular rate and rhythm, no murmurs, rubs, or gallops  Chest: Normal work of breathing and chest excursion bilaterally  Lungs: Clear to ausculation bilaterally  Abdomen: Soft, non tender, non distended, normoactive bowel sounds  Back: No evidence of trauma or deformity  Extremities: No evidence of trauma or deformity, no LE edema  Skin: Warm and dry, normal cap refill  Neuro: Alert and appropriate  Psychiatric: Normal mood and affect      Diagnostic Study Results     Labs -   No results found for this or any previous visit (from the past 12 hour(s)). Radiologic Studies -   CT ABD PELV WO CONT   Final Result      1. Vicarious excretion of contrast in the gallbladder lumen with persistent   renal cortical enhancement, correlate for renal impairment. No hydronephrosis. 2.  Please see recent CTA chest for additional details. 3.  Osseous degenerative changes and additional findings, as detailed in the   body of the report. CTA CHEST W OR W WO CONT   Final Result      1. No evidence of pulmonary thromboembolic disease. 2.  No infiltrate or other acute pulmonary abnormality. XR CHEST PORT   Final Result      No plain film evidence of acute cardiopulmonary disease, allowing for technique. CT Results  (Last 48 hours)    None        CXR Results  (Last 48 hours)    None          Medications given in the ED-  Medications - No data to display      Medical Decision Making   I am the first provider for this patient. I reviewed the vital signs, available nursing notes, past medical history, past surgical history, family history and social history. Vital Signs-Reviewed the patient's vital signs.     Pulse Oximetry Analysis - 100% on room air, not hypoxic     Cardiac Monitor:  Rate: 94 bpm  Rhythm: Normal sinus    EKG interpretation: (Preliminary)  EKG read by Dr. Chau Schulz at 1:48 PM  Normal sinus rhythm at a rate of 99 bpm, CO interval 130 ms, QRS duration of 84 ms, similar when compared to prior from October 2019    Records Reviewed: Nursing Notes, Old Medical Records and Previous electrocardiograms    Provider Notes (Medical Decision Making): Jose Amos is a 63 y.o. female presenting with exertional chest pain. Wells low risk, PERC positive, D-dimer positive, CTA negative for PE. Moderate to high risk heart score. Discussed with cardiology with recommendation for further in-hospital cardiac or stratification. Patient understands and agrees with this plan. Hospitalist has accepted for further management. Procedures:  Procedures    ED Course:   CONSULT NOTE:   4:48 PM  Dr. Redd Fletcher spoke with Dr. Silva Lopez   Specialty: Cardiology  Discussed pt's hx, disposition, and available diagnostic and imaging results over the telephone. Reviewed care plans. Was supposed to have stress today, but did not show for her appointment. DVT prophylaxis anticoagulation. CONSULT NOTE:   5:06 PM  Dr. Redd Fletcher spoke with Dr. Louisa Yang   Specialty: Hospitalist  Discussed pt's hx, disposition, and available diagnostic and imaging results over the telephone. Reviewed care plans. Accepts for observation on telemetry. Diagnosis and Disposition     Critical Care Time: None    Core Measures:  For Hospitalized Patients:    1. Hospitalization Decision Time:  The decision to hospitalize the patient was made by Dr. Redd Fletcher at 4:50 PM on 3/1/2021    4:52 PM  Patient is being admitted to the hospital by Dr. Louisa Yang. The results of their tests and reasons for their admission have been discussed with them and/or available family. They convey agreement and understanding for the need to be admitted and for their admission diagnosis. CONDITIONS ON ADMISSION:  Sepsis is not present at the time of admission. Deep Vein Thrombosis is not present at the time of admission. Thrombosis is not present at the time of admission. Urinary Tract Infection is not present at the time of admission. Pneumonia is not present at the time of admission.  MRSA is not present at the time of admission. Wound infection is not present at the time of admission. Pressure Ulcer is not present at the time of admission. CLINICAL IMPRESSION:    1. Acute chest pain      _______________________________      Please note that this dictation was completed with [employer], the computer voice recognition software. Quite often unanticipated grammatical, syntax, homophones, and other interpretive errors are inadvertently transcribed by the computer software. Please disregard these errors. Please excuse any errors that have escaped final proofreading.

## 2021-03-01 NOTE — ED NOTES
TRANSFER - OUT REPORT:    Verbal report given to Paulino Gorge RN(name) on Jose Rang  being transferred to Telemetry(unit) for routine progression of care       Report consisted of patients Situation, Background, Assessment and   Recommendations(SBAR). Information from the following report(s) SBAR, Kardex, ED Summary, Recent Results and Cardiac Rhythm sinus rhythm was reviewed with the receiving nurse. Lines:   Peripheral IV 03/01/21 Right Antecubital (Active)   Site Assessment Clean, dry, & intact 03/01/21 1356   Phlebitis Assessment 0 03/01/21 1356   Infiltration Assessment 0 03/01/21 1356   Dressing Status Clean, dry, & intact 03/01/21 1356   Dressing Type Tape;Transparent 03/01/21 1356   Hub Color/Line Status Pink;Flushed;Capped 03/01/21 1356   Action Taken Blood drawn;Open ports on tubing capped 03/01/21 1356   Alcohol Cap Used Yes 03/01/21 1356        Opportunity for questions and clarification was provided.       Patient transported with:   Monitor  Registered Nurse

## 2021-03-01 NOTE — ED TRIAGE NOTES
Pt arrives to ED with c/o right arm and lower abdominal pain. Pt states she has rheumatoid arthritis and fibromyalgia. Pt states this pain feel similar. Denies any fever, chills, n/v/d or exposure to acute illness. Pt states she was recently seen and given and IM injection and NSAIDs and that helped her pain. Pt also states that she is having chest tightness and difficulty taking a deep breath. Pt denies any radiation.

## 2021-03-01 NOTE — ED NOTES
Dinner tray order placed with room service at this time (cardiac diet) and to be delivered to room on unit

## 2021-03-02 ENCOUNTER — APPOINTMENT (OUTPATIENT)
Dept: NON INVASIVE DIAGNOSTICS | Age: 66
DRG: 556 | End: 2021-03-02
Attending: HOSPITALIST
Payer: MEDICARE

## 2021-03-02 ENCOUNTER — APPOINTMENT (OUTPATIENT)
Dept: NUCLEAR MEDICINE | Age: 66
DRG: 556 | End: 2021-03-02
Attending: INTERNAL MEDICINE
Payer: MEDICARE

## 2021-03-02 ENCOUNTER — APPOINTMENT (OUTPATIENT)
Dept: NON INVASIVE DIAGNOSTICS | Age: 66
DRG: 556 | End: 2021-03-02
Attending: INTERNAL MEDICINE
Payer: MEDICARE

## 2021-03-02 ENCOUNTER — APPOINTMENT (OUTPATIENT)
Dept: CT IMAGING | Age: 66
DRG: 556 | End: 2021-03-02
Attending: HOSPITALIST
Payer: MEDICARE

## 2021-03-02 LAB
ANION GAP SERPL CALC-SCNC: 8 MMOL/L (ref 3–18)
ATRIAL RATE: 99 BPM
AV VELOCITY RATIO: 0.74
AV VTI RATIO: 0.7
BUN SERPL-MCNC: 8 MG/DL (ref 7–18)
BUN/CREAT SERPL: 9 (ref 12–20)
CALCIUM SERPL-MCNC: 9.2 MG/DL (ref 8.5–10.1)
CALCULATED P AXIS, ECG09: 62 DEGREES
CALCULATED R AXIS, ECG10: 41 DEGREES
CALCULATED T AXIS, ECG11: 61 DEGREES
CHLORIDE SERPL-SCNC: 108 MMOL/L (ref 100–111)
CHOLEST SERPL-MCNC: 142 MG/DL
CK MB CFR SERPL CALC: 0.7 % (ref 0–4)
CK MB CFR SERPL CALC: 0.7 % (ref 0–4)
CK MB SERPL-MCNC: 1.7 NG/ML (ref 5–25)
CK MB SERPL-MCNC: 1.7 NG/ML (ref 5–25)
CK SERPL-CCNC: 250 U/L (ref 26–192)
CK SERPL-CCNC: 251 U/L (ref 26–192)
CO2 SERPL-SCNC: 27 MMOL/L (ref 21–32)
CREAT SERPL-MCNC: 0.92 MG/DL (ref 0.6–1.3)
DIAGNOSIS, 93000: NORMAL
ECHO AO ROOT DIAM: 2.85 CM
ECHO AV AREA PEAK VELOCITY: 2.1 CM2
ECHO AV AREA VTI: 2.1 CM2
ECHO AV AREA/BSA PEAK VELOCITY: 1 CM2/M2
ECHO AV AREA/BSA VTI: 1 CM2/M2
ECHO AV MEAN GRADIENT: 5.3 MMHG
ECHO AV MEAN VELOCITY: 1.11 M/S
ECHO AV PEAK GRADIENT: 8.6 MMHG
ECHO AV PEAK VELOCITY: 146.67 CM/S
ECHO AV VTI: 30.81 CM
ECHO IVC PROX: 1.69 CM
ECHO IVC SNIFF: 1.69 CM
ECHO LA MAJOR AXIS: 2.93 CM
ECHO LA MINOR AXIS: 1.43 CM
ECHO LA TO AORTIC ROOT RATIO: 1.03
ECHO LA VOL 2C: 36.38 ML (ref 22–52)
ECHO LA VOL 4C: 34.48 ML (ref 22–52)
ECHO LA VOL BP: 40.57 ML (ref 22–52)
ECHO LA VOL/BSA BIPLANE: 19.73 ML/M2 (ref 16–28)
ECHO LA VOLUME INDEX A2C: 17.7 ML/M2 (ref 16–28)
ECHO LA VOLUME INDEX A4C: 16.77 ML/M2 (ref 16–28)
ECHO LV E' LATERAL VELOCITY: 8 CM/S
ECHO LV E' SEPTAL VELOCITY: 5 CM/S
ECHO LV EDV A2C: 110.1 ML
ECHO LV EDV A4C: 112.3 ML
ECHO LV EDV BP: 112.2 ML (ref 56–104)
ECHO LV EDV INDEX A4C: 54.6 ML/M2
ECHO LV EDV INDEX BP: 54.6 ML/M2
ECHO LV EDV NDEX A2C: 53.6 ML/M2
ECHO LV EDV TEICHHOLZ: 0.75 ML
ECHO LV EJECTION FRACTION A2C: 65 %
ECHO LV EJECTION FRACTION A4C: 50 %
ECHO LV EJECTION FRACTION BIPLANE: 56.6 % (ref 55–100)
ECHO LV ESV A2C: 38.4 ML
ECHO LV ESV A4C: 55.7 ML
ECHO LV ESV BP: 48.7 ML (ref 19–49)
ECHO LV ESV INDEX A2C: 18.7 ML/M2
ECHO LV ESV INDEX A4C: 27.1 ML/M2
ECHO LV ESV INDEX BP: 23.7 ML/M2
ECHO LV ESV TEICHHOLZ: 0.36 ML
ECHO LV INTERNAL DIMENSION DIASTOLIC: 5.1 CM (ref 3.9–5.3)
ECHO LV INTERNAL DIMENSION SYSTOLIC: 3.76 CM
ECHO LV IVSD: 1.21 CM (ref 0.6–0.9)
ECHO LV MASS 2D: 237 G (ref 67–162)
ECHO LV MASS INDEX 2D: 115.3 G/M2 (ref 43–95)
ECHO LV POSTERIOR WALL DIASTOLIC: 1.16 CM (ref 0.6–0.9)
ECHO LVOT DIAM: 1.9 CM
ECHO LVOT PEAK GRADIENT: 4.8 MMHG
ECHO LVOT PEAK VELOCITY: 109.13 CM/S
ECHO LVOT SV: 63.6 ML
ECHO LVOT VTI: 22.52 CM
ECHO MV A VELOCITY: 88.31 CM/S
ECHO MV AREA PHT: 5.9 CM2
ECHO MV E DECELERATION TIME (DT): 129.5 MS
ECHO MV E VELOCITY: 61.33 CM/S
ECHO MV E/A RATIO: 0.69
ECHO MV E/E' LATERAL: 7.67
ECHO MV E/E' RATIO (AVERAGED): 9.97
ECHO MV E/E' SEPTAL: 12.27
ECHO MV PRESSURE HALF TIME (PHT): 37.6 MS
ECHO RA AREA 4C: 10.62 CM2
ECHO RV INTERNAL DIMENSION: 2.3 CM
ECHO RV TAPSE: 2.1 CM (ref 1.5–2)
ECHO TV REGURGITANT MAX VELOCITY: 225.32 CM/S
ECHO TV REGURGITANT PEAK GRADIENT: 20.3 MMHG
GLUCOSE SERPL-MCNC: 81 MG/DL (ref 74–99)
HDLC SERPL-MCNC: 43 MG/DL (ref 40–60)
HDLC SERPL: 3.3 {RATIO} (ref 0–5)
LDLC SERPL CALC-MCNC: 73.8 MG/DL (ref 0–100)
LIPID PROFILE,FLP: NORMAL
LVFS 2D: 26.3 %
LVOT MG: 2.87 MMHG
LVOT MV: 0.8 CM/S
LVSV (MOD BI): 29.76 ML
LVSV (MOD SINGLE 4C): 26.53 ML
LVSV (MOD SINGLE): 33.6 ML
LVSV (TEICH): 29.67 ML
MV DEC SLOPE: 4.74
P-R INTERVAL, ECG05: 138 MS
POTASSIUM SERPL-SCNC: 4.3 MMOL/L (ref 3.5–5.5)
Q-T INTERVAL, ECG07: 364 MS
QRS DURATION, ECG06: 84 MS
QTC CALCULATION (BEZET), ECG08: 467 MS
SODIUM SERPL-SCNC: 143 MMOL/L (ref 136–145)
TRIGL SERPL-MCNC: 126 MG/DL (ref ?–150)
TROPONIN I SERPL-MCNC: <0.02 NG/ML (ref 0–0.04)
TROPONIN I SERPL-MCNC: <0.02 NG/ML (ref 0–0.04)
VENTRICULAR RATE, ECG03: 99 BPM
VLDLC SERPL CALC-MCNC: 25.2 MG/DL

## 2021-03-02 PROCEDURE — 77030035694 HC MSK BIPAP FLL FAC PERFMAX PHIL -B

## 2021-03-02 PROCEDURE — 80048 BASIC METABOLIC PNL TOTAL CA: CPT

## 2021-03-02 PROCEDURE — 93306 TTE W/DOPPLER COMPLETE: CPT

## 2021-03-02 PROCEDURE — 77030022645 HC CRCT CPAP/BPAP RESM -B

## 2021-03-02 PROCEDURE — 94660 CPAP INITIATION&MGMT: CPT

## 2021-03-02 PROCEDURE — 74176 CT ABD & PELVIS W/O CONTRAST: CPT

## 2021-03-02 PROCEDURE — 74011250636 HC RX REV CODE- 250/636: Performed by: HOSPITALIST

## 2021-03-02 PROCEDURE — 96376 TX/PRO/DX INJ SAME DRUG ADON: CPT

## 2021-03-02 PROCEDURE — 96372 THER/PROPH/DIAG INJ SC/IM: CPT

## 2021-03-02 PROCEDURE — 99218 HC RM OBSERVATION: CPT

## 2021-03-02 PROCEDURE — 74011250637 HC RX REV CODE- 250/637: Performed by: HOSPITALIST

## 2021-03-02 PROCEDURE — 82550 ASSAY OF CK (CPK): CPT

## 2021-03-02 PROCEDURE — 36415 COLL VENOUS BLD VENIPUNCTURE: CPT

## 2021-03-02 PROCEDURE — 80061 LIPID PANEL: CPT

## 2021-03-02 RX ADMIN — MORPHINE SULFATE 1 MG: 2 INJECTION, SOLUTION INTRAMUSCULAR; INTRAVENOUS at 01:15

## 2021-03-02 RX ADMIN — MORPHINE SULFATE 1 MG: 2 INJECTION, SOLUTION INTRAMUSCULAR; INTRAVENOUS at 21:23

## 2021-03-02 RX ADMIN — ASPIRIN 81 MG: 81 TABLET, CHEWABLE ORAL at 08:29

## 2021-03-02 RX ADMIN — PREGABALIN 100 MG: 100 CAPSULE ORAL at 17:49

## 2021-03-02 RX ADMIN — HEPARIN SODIUM 5000 UNITS: 5000 INJECTION INTRAVENOUS; SUBCUTANEOUS at 01:15

## 2021-03-02 RX ADMIN — HYDROCHLOROTHIAZIDE 12.5 MG: 25 TABLET ORAL at 08:30

## 2021-03-02 RX ADMIN — ALLOPURINOL 100 MG: 100 TABLET ORAL at 08:29

## 2021-03-02 RX ADMIN — ROSUVASTATIN CALCIUM 20 MG: 10 TABLET, COATED ORAL at 21:23

## 2021-03-02 RX ADMIN — PREGABALIN 100 MG: 100 CAPSULE ORAL at 08:29

## 2021-03-02 RX ADMIN — AMITRIPTYLINE HYDROCHLORIDE 25 MG: 50 TABLET, FILM COATED ORAL at 21:23

## 2021-03-02 RX ADMIN — PREGABALIN 100 MG: 100 CAPSULE ORAL at 21:23

## 2021-03-02 RX ADMIN — HEPARIN SODIUM 5000 UNITS: 5000 INJECTION INTRAVENOUS; SUBCUTANEOUS at 17:49

## 2021-03-02 RX ADMIN — HEPARIN SODIUM 5000 UNITS: 5000 INJECTION INTRAVENOUS; SUBCUTANEOUS at 11:55

## 2021-03-02 NOTE — PROGRESS NOTES
Pt initially placed on cpap for HS at this time. 03/02/21 0335   Oxygen Therapy   O2 Device CPAP mask   FIO2 (%) 21 %   Respiratory   Respiratory (WDL) WDL   Respiratory Pattern Regular   Chest/Tracheal Assessment Chest expansion, symmetrical   CPAP/BIPAP   CPAP/BIPAP Start/Stop On   Device Mode CPAP, auto-titrating   $$ CPAP Daily Yes   [employer] ID # [id number]   Mask Type and Size Full face; Medium   PIP Observed 5 cm H20   EPAP (cm H2O)   (5-20)   Total RR (Spontaneous) 18 breaths per minute   Pt's Home Machine No   Biomedical Check Performed Yes   Settings Verified Yes

## 2021-03-02 NOTE — PROGRESS NOTES
Hospitalist Progress Note-critical care note     Patient: Saul Valdez MRN: [id number]  CSN: [id number]    YOB: 1955  Age: 63 y.o. Sex: female    DOA: 3/1/2021 LOS:  LOS: 0 days            Chief complaint: chest pain, hld, htn, morbid obesity     Assessment/Plan         Hospital Problems  Date Reviewed: 10/30/2019          Codes Class Noted POA    * (Principal) Chest pain ICD-10-CM: R07.9  ICD-9-CM: 786.50  3/1/2021 Unknown        High cholesterol ICD-10-CM: E78.00  ICD-9-CM: 272.0  Unknown Unknown        Hypertension ICD-10-CM: I10  ICD-9-CM: 401.9  Unknown Unknown        Morbid obesity ([facility] [address] ICD-10-CM: E66.01  ICD-9-CM: 278.01  Unknown Unknown              Chest pain  No chest pain overnight   Stress test tomorrow       Abdomen pain ct abdomen no acute process      Hypertension  Continue home medication     Hyperlipidemia  Continue statin     Morbid obesity  Lifestyle modification     Fibromyalgia  Continue   Continue muscle relaxant and Lyrica    Subjective: no chest pain, pain in abdomen   Disposition :tbd,   Review of systems:    General: No fevers or chills. Cardiovascular: No chest pain or pressure. No palpitations. Pulmonary: No shortness of breath. Gastrointestinal: No nausea, vomiting. + abdomen pain     Vital signs/Intake and Output:  Visit Vitals  BP (!) 147/81 (BP 1 Location: Right upper arm)   Pulse 94   Temp 98.8 °F (37.1 °C)   Resp 14   Ht 5' 5\" (1.651 m)   Wt 99.3 kg (219 lb)   SpO2 100%   BMI 36.44 kg/m²     Current Shift:  03/02 0701 - 03/02 1900  In: 1440 [P.O.:1440]  Out: 1450 [Urine:1450]  Last three shifts:  No intake/output data recorded. Physical Exam:  General: WD, WN. Alert, cooperative, no acute distress    HEENT: NC, Atraumatic. PERRLA, anicteric sclerae. Lungs: CTA Bilaterally. No Wheezing/Rhonchi/Rales. Heart:  Regular  rhythm,  No murmur, No Rubs, No Gallops  Abdomen: Soft, Non distended, mild tender.   +Bowel sounds,   Extremities: No c/c/e   Psych: Not anxious or agitated. Neurologic:  No acute neurological deficit. Labs: Results:       Chemistry Recent Labs     03/02/21  0240 03/01/21  1345   GLU 81 91    138   K 4.3 4.0    107   CO2 27 28   BUN 8 9   CREA 0.92 0.98   CA 9.2 9.5   AGAP 8 3   BUCR 9* 9*   AP  --  83   TP  --  8.1   ALB  --  3.8   GLOB  --  4.3*   AGRAT  --  0.9      CBC w/Diff Recent Labs     03/01/21  1345   WBC 6.4   RBC 4.40   HGB 13.5   HCT 41.6      GRANS 31*   LYMPH 52   EOS 2      Cardiac Enzymes Recent Labs     03/02/21  0900 03/02/21  0240   * 251*   CKND1 0.7 0.7      Coagulation No results for input(s): PTP, INR, APTT, INREXT in the last 72 hours. Lipid Panel Lab Results   Component Value Date/Time    Cholesterol, total 142 03/02/2021 02:40 AM    HDL Cholesterol 43 03/02/2021 02:40 AM    LDL, calculated 73.8 03/02/2021 02:40 AM    VLDL, calculated 25.2 03/02/2021 02:40 AM    Triglyceride 126 03/02/2021 02:40 AM    CHOL/HDL Ratio 3.3 03/02/2021 02:40 AM      BNP No results for input(s): BNPP in the last 72 hours. Liver Enzymes Recent Labs     03/01/21  1345   TP 8.1   ALB 3.8   AP 83      Thyroid Studies No results found for: T4, T3U, TSH, TSHEXT     Procedures/imaging: see electronic medical records for all procedures/Xrays and details which were not copied into this note but were reviewed prior to creation of Plan    Ct Chest Wo Cont    Result Date: 3/2/2021  COMPARISON: CTA chest 3/1/2021 at 1511 hours. INDICATION: COPD, chronic obstructive pulmonary disease. History of hypertension and high cholesterol. TECHNIQUE: Axial was performed through the chest without contrast.  Coronal and sagittal reformations were generated. One or more dose reduction techniques were used on this CT: automated exposure control, adjustment of the mAs and/or kVp according to patient size, and iterative reconstruction techniques.  The specific techniques used on this CT exam have been documented in the patient's electronic medical record. Digital Imaging and Communications in Medicine (DICOM) format image data are available to nonaffiliated external healthcare facilities or entities on a secure, media free, reciprocally searchable basis with patient authorization for at least a 12-month period after this study. ============ NECK: Thyroid normal. HEART: The heart is normal size with mild aortic annular calcifications. Dense atherosclerotic calcifications throughout the coronary arteries. No pericardial effusion. The great vessels are normal caliber. LUNGS: Mild bibasilar atelectasis. 4 mm pleural nodule along the right lower lobe (axial image 132), likely pleural lymph node. No suspicious pulmonary nodule, mass, or opacity. PLEURA: Normal, with no effusion or pneumothorax. AIRWAY: Unremarkable. LYMPH NODES: No mediastinal, hilar or axillary lymphadenopathy. Mildly enlarged portacaval node measuring 1.2 cm (series 2 image 27), nonspecific. UPPER ABDOMEN: Small hiatal hernia present. Hyperdense material in the gallbladder lumen likely represents sludge. OTHER: No acute osseous abnormality. Multilevel degenerative changes throughout the spine. ============     1. No acute abnormality. No suspicious pulmonary nodule, mass, or consolidation. 2.  Atherosclerosis including the coronary arteries, correlate for acute coronary syndrome. Cta Chest W Or W Wo Cont    Result Date: 3/1/2021  EXAM: CTA CHEST W OR W WO CONT CLINICAL INDICATION/HISTORY: Chest pain COMPARISON: Chest radiograph same day TECHNIQUE: Thin section CT was performed through the chest during pulmonary arterial enhancement. MIP 3D reconstructions were generated to increase sensitivity in the detection of pulmonary emboli. One or more dose reduction techniques were used on this CT: automated exposure control, adjustment of the mAs and/or kVp according to patient size, and iterative reconstruction techniques.   The specific techniques used on this CT exam have been documented in the patient's electronic medical record. Digital Imaging and Communications in Medicine (DICOM) format image data are available to nonaffiliated external healthcare facilities or entities on a secure, media free, reciprocally searchable basis with patient authorization for at least a 12-month period after this study. --- EXAM QUALITY --- 1. Overall exam quality- satisfactory: adequate 2. Adequacy of pulmonary enhancement-adequate: sufficient enhancement for diagnosis down to and including subsegmental vessels. Main PA density 190-250 HU 3. Adequacy of breath hold- adequate: sufficient enough to render diagnosis 4. There are no significant noise, beam hardening, streak artifacts affecting scan quality. _______________ FINDINGS: ---  PULMONARY CT ANGIOGRAM  --- PULMONARY VASCULATURE: The right and left central, primary segmental and subsegmental pulmonary arteries appear patent. There is no convincing evidence of intraluminal filling defect identified to suggest pulmonary embolism. THORACIC AORTA: Normal caliber thoracic aorta. No aortic aneurysm, intramural hematoma or dissection. ---  CT CHEST --- LUNG PARENCHYMA:  There is mild bibasilar dependent atelectasis. The lung parenchyma appears otherwise clear. No pulmonary infiltration or consolidation identified. No pulmonary nodule or mass identified. PLEURAL SPACES:   No pleural effusion or pneumothorax. MEDIASTINUM: Global thyromegaly. No thoracic lymphadenopathy. No global cardiomegaly. No pericardial effusion. OSSEOUS STRUCTURES:   No acute osseous abnormality. Mild thoracic degenerative disk disease. UPPER ABDOMEN: No acute abnormality. Small hiatal hernia. Diffuse hepatic steatosis. _______________     1. No evidence of pulmonary thromboembolic disease. 2.  No infiltrate or other acute pulmonary abnormality. Ct Abd Pelv Wo Cont    Result Date: 3/2/2021  EXAM: CT ABD PELV WO CONT CLINICAL INDICATION/HISTORY: abdomen pain.  Patient reports several years of left-sided abdominal pain. COMPARISON: CTA chest 3/1/2021 TECHNIQUE:   CT of the abdomen and pelvis without intravenous contrast administration. Coronal and sagittal reformats were generated and reviewed. One or more dose reduction techniques were used on this CT: automated exposure control, adjustment of the mAs and/or kVp according to patient size, and iterative reconstruction techniques. The specific techniques used on this CT exam have been documented in the patient's electronic medical record. Digital Imaging and Communications in Medicine (DICOM) format image data are available to nonaffiliated external healthcare facilities or entities on a secure, media free, reciprocally searchable basis with patient authorization for at least a 12-month period after this study. _______________ FINDINGS: Suboptimal evaluation given lack of intravenous contrast. LOWER THORAX: Atherosclerosis including the coronary arteries, partially visualized. No global cardiomegaly or pericardial effusion. Minimal basilar atelectasis. No pleural effusion. LIVER: Normal contours. No suspicious liver lesions on this unenhanced CT. GALLBLADDER AND BILIARY: Hyperdense material in the gallbladder lumen likely represents vicarious excretion of contrast. No biliary dilation. SPLEEN: Normal. PANCREAS: Normal. ADRENALS: Normal. KIDNEYS: Contrast seen within the cortex of both kidneys likely related to recent CT chest. No perinephric stranding, radiopaque stone or hydronephrosis. GI TRACT: Small hiatal hernia present. Normal caliber small and large bowel loops. No morphology of bowel obstruction. Normal appendix. BLADDER: Normal. PELVIC ORGANS: Prior hysterectomy. VASCULATURE: Normal caliber lower thoracic and abdominal aorta. Dense calcified atherosclerotic plaque especially in the infrarenal aorta and branch vessels. LYMPH NODES: No mesenteric or retroperitoneal lymphadenopathy. OTHER: No free intraperitoneal air. No ascites. Small broad-based fat-containing umbilical hernia present. OSSEOUS STRUCTURES:  No acute osseous abnormality. Defect in the ventral right iliac (axial image 82), likely postsurgical. Transitional lumbosacral anatomy with trace anterolisthesis L5 on S1. Multilevel degenerative changes most pronounced in the lumbar spine. _______________     1. Vicarious excretion of contrast in the gallbladder lumen with persistent renal cortical enhancement, correlate for renal impairment. No hydronephrosis. 2.  Please see recent CTA chest for additional details. 3.  Osseous degenerative changes and additional findings, as detailed in the body of the report. Xr Chest Port    Result Date: 3/1/2021  HISTORY: -Provided with order: chest pain -Additional: None Technique : AP PORTABLE CHEST Comparison : None. FINDINGS: HEART AND MEDIASTINUM: Unremarkable. LUNGS AND PLEURAL SPACES: No consolidation, congestive heart failure, pleural effusion or pneumothorax. BONY THORAX AND SOFT TISSUES: No acute osseous abnormality. No plain film evidence of acute cardiopulmonary disease, allowing for technique.        Deonte Hong MD

## 2021-03-02 NOTE — PROGRESS NOTES
Problem: Falls - Risk of  Goal: *Absence of Falls  Description: Document Merlin Yu Fall Risk and appropriate interventions in the flowsheet.   Outcome: Progressing Towards Goal  Note: Fall Risk Interventions:            Medication Interventions: Teach patient to arise slowly                   Problem: Patient Education: Go to Patient Education Activity  Goal: Patient/Family Education  Outcome: Progressing Towards Goal     Problem: Patient Education: Go to Patient Education Activity  Goal: Patient/Family Education  Outcome: Progressing Towards Goal     Problem: Unstable angina/NSTEMI: Day of Admission/Day 1  Goal: Off Pathway (Use only if patient is Off Pathway)  Outcome: Progressing Towards Goal  Goal: Activity/Safety  Outcome: Progressing Towards Goal  Goal: Consults, if ordered  Outcome: Progressing Towards Goal  Goal: Diagnostic Test/Procedures  Outcome: Progressing Towards Goal  Goal: Nutrition/Diet  Outcome: Progressing Towards Goal  Goal: Discharge Planning  Outcome: Progressing Towards Goal  Goal: Medications  Outcome: Progressing Towards Goal  Goal: Respiratory  Outcome: Progressing Towards Goal  Goal: Treatments/Interventions/Procedures  Outcome: Progressing Towards Goal  Goal: Psychosocial  Outcome: Progressing Towards Goal  Goal: *Hemodynamically stable  Outcome: Progressing Towards Goal  Goal: *Optimal pain control at patient's stated goal  Outcome: Progressing Towards Goal  Goal: *Lungs clear or at baseline  Outcome: Progressing Towards Goal     Problem: Unstable angina/NSTEMI: Day 2  Goal: Off Pathway (Use only if patient is Off Pathway)  Outcome: Progressing Towards Goal  Goal: Activity/Safety  Outcome: Progressing Towards Goal  Goal: Consults, if ordered  Outcome: Progressing Towards Goal  Goal: Diagnostic Test/Procedures  Outcome: Progressing Towards Goal  Goal: Nutrition/Diet  Outcome: Progressing Towards Goal  Goal: Discharge Planning  Outcome: Progressing Towards Goal  Goal: Medications  Outcome: Progressing Towards Goal  Goal: Respiratory  Outcome: Progressing Towards Goal  Goal: Treatments/Interventions/Procedures  Outcome: Progressing Towards Goal  Goal: Psychosocial  Outcome: Progressing Towards Goal  Goal: *Hemodynamically stable  Outcome: Progressing Towards Goal  Goal: *Optimal pain control at patient's stated goal  Outcome: Progressing Towards Goal  Goal: *Lungs clear or at baseline  Outcome: Progressing Towards Goal     Problem: Unstable Angina/NSTEMI: Discharge Outcomes  Goal: *Hemodynamically stable  Outcome: Progressing Towards Goal  Goal: *Stable cardiac rhythm  Outcome: Progressing Towards Goal  Goal: *Lungs clear or at baseline  Outcome: Progressing Towards Goal  Goal: *Optimal pain control at patient's stated goal  Outcome: Progressing Towards Goal  Goal: *Identifies cardiac risk factors  Outcome: Progressing Towards Goal  Goal: *Verbalizes home exercise program, activity guidelines, cardiac precautions  Outcome: Progressing Towards Goal  Goal: *Verbalizes understanding and describes prescribed diet  Outcome: Progressing Towards Goal  Goal: *Verbalizes name, dosage, time, side effects, and number of days to continue medications  Outcome: Progressing Towards Goal  Goal: *Anxiety reduced or absent  Outcome: Progressing Towards Goal  Goal: *Understands and describes signs and symptoms to report to providers(Stroke Metric)  Outcome: Progressing Towards Goal  Goal: *Describes follow-up/return visits to physicians  Outcome: Progressing Towards Goal  Goal: *Describes available resources and support systems  Outcome: Progressing Towards Goal  Goal: *Influenza immunization  Outcome: Progressing Towards Goal  Goal: *Pneumococcal immunization  Outcome: Progressing Towards Goal  Goal: *Describes smoking cessation resources  Outcome: Progressing Towards Goal     Problem: General Medical Care Plan  Goal: *Vital signs within specified parameters  Outcome: Progressing Towards Goal  Goal: *Labs within defined limits  Outcome: Progressing Towards Goal  Goal: *Absence of infection signs and symptoms  Outcome: Progressing Towards Goal  Goal: *Optimal pain control at patient's stated goal  Outcome: Progressing Towards Goal  Goal: *Skin integrity maintained  Outcome: Progressing Towards Goal  Goal: *Fluid volume balance  Outcome: Progressing Towards Goal  Goal: *Optimize nutritional status  Outcome: Progressing Towards Goal  Goal: *Anxiety reduced or absent  Outcome: Progressing Towards Goal  Goal: *Progressive mobility and function (eg: ADL's)  Outcome: Progressing Towards Goal     Problem: Patient Education: Go to Patient Education Activity  Goal: Patient/Family Education  Outcome: Progressing Towards Goal

## 2021-03-02 NOTE — PROGRESS NOTES
Bedside and Verbal shift change report given to Kerry Kawasaki RN (oncoming nurse) by Charis Perez RN (offgoing nurse). Report included the following information SBAR, Kardex, Intake/Output, MAR and Recent Results. Shift assessment complete, Pr AO x4 and denies pain at time of assessment. Lung sounds clear on room air. Abdomen soft and intact, pt reports intermittent abdominal pain that she states \"It is my arthritis. \" MD aware, CT ordered. Bed locked and in lowest position & call light within reach. Shift uneventful, Bedside and Verbal shift change report given to Guru Duarte RN (oncoming nurse) by Kerry Kawasaki RN (offgoing nurse). Report included the following information SBAR, Kardex, Intake/Output, MAR and Recent Results.

## 2021-03-02 NOTE — PROGRESS NOTES
Cardiology Progress Note        Patient: Anthony Mar        Sex: female          DOA: 3/1/2021  YOB: 1955      Age:  63 y.o.        LOS:  LOS: 0 days   Assessment/Plan     Principal Problem:    Chest pain (3/1/2021)    Active Problems:    High cholesterol ()      Hypertension ()      Morbid obesity (HCC) ()        Plan:  Chest pain  Patient stress test is scheduled for tomorrow due to patient having breakfast in the morning today. Discussed with patient  Continue with current medical treatment. Subjective:    cc:  Chest pain        REVIEW OF SYSTEMS:     General: No fevers or chills. Cardiovascular: No chest pain or pressure. No palpitations. No ankle swelling  Pulmonary: No SOB, orthopnea, PND  Gastrointestinal: No nausea, vomiting or diarrhea      Objective:      Visit Vitals  BP (!) 147/81 (BP 1 Location: Right upper arm)   Pulse 94   Temp 98.8 °F (37.1 °C)   Resp 14   Ht 5' 5\" (1.651 m)   Wt 99.3 kg (219 lb)   SpO2 100%   BMI 36.44 kg/m²     Body mass index is 36.44 kg/m². Physical Exam:  General Appearance: Comfortable, not using accessory muscles of respiration. NECK: No JVD, no thyroidomeglay. LUNGS: Clear bilaterally. HEART: S1+S2 audible,    ABD: Non-tender, BS Audible    EXT: No edema, and no cysnosis. VASCULAR EXAM: Pulses are intact. PSYCHIATRIC EXAM: Mood is appropriate.     Medication:  Current Facility-Administered Medications   Medication Dose Route Frequency    regadenoson (LEXISCAN) injection 0.4 mg  0.4 mg IntraVENous RAD ONCE    aspirin chewable tablet 81 mg  81 mg Oral DAILY    nitroglycerin (NITROSTAT) tablet 0.4 mg  0.4 mg SubLINGual Q5MIN PRN    acetaminophen (TYLENOL) tablet 650 mg  650 mg Oral Q4H PRN    morphine injection 1 mg  1 mg IntraVENous Q4H PRN    heparin (porcine) injection 5,000 Units  5,000 Units SubCUTAneous Q8H    amitriptyline (ELAVIL) tablet 25 mg  25 mg Oral QHS    allopurinoL (ZYLOPRIM) tablet 100 mg  100 mg Oral DAILY    pregabalin (LYRICA) capsule 100 mg  100 mg Oral TID    rosuvastatin (CRESTOR) tablet 20 mg  20 mg Oral QHS    hydroCHLOROthiazide (HYDRODIURIL) tablet 12.5 mg  12.5 mg Oral DAILY               Lab/Data Reviewed:  Procedures/imaging: see electronic medical records for all procedures/Xrays   and details which were not copied into this note but were reviewed prior to creation of Plan       All lab results for the last 24 hours reviewed. Recent Labs     03/01/21  1345   WBC 6.4   HGB 13.5   HCT 41.6        Recent Labs     03/02/21  0240 03/01/21  1345    138   K 4.3 4.0    107   CO2 27 28   GLU 81 91   BUN 8 9   CREA 0.92 0.98   CA 9.2 9.5       RADIOLOGY:  CT Results  (Last 48 hours)               03/02/21 1000  CT ABD PELV WO CONT Final result    Impression:      1. Vicarious excretion of contrast in the gallbladder lumen with persistent   renal cortical enhancement, correlate for renal impairment. No hydronephrosis. 2.  Please see recent CTA chest for additional details. 3.  Osseous degenerative changes and additional findings, as detailed in the   body of the report. Narrative:  EXAM: CT ABD PELV WO CONT       CLINICAL INDICATION/HISTORY: abdomen pain. Patient reports several years of   left-sided abdominal pain. COMPARISON: CTA chest 3/1/2021       TECHNIQUE:   CT of the abdomen and pelvis without intravenous contrast   administration. Coronal and sagittal reformats were generated and reviewed. One or more dose reduction techniques were used on this CT: automated exposure   control, adjustment of the mAs and/or kVp according to patient size, and   iterative reconstruction techniques. The specific techniques used on this CT   exam have been documented in the patient's electronic medical record.   Digital   Imaging and Communications in Medicine (DICOM) format image data are available   to nonaffiliated external healthcare facilities or entities on a secure, media   free, reciprocally searchable basis with patient authorization for at least a   12-month period after this study. _______________       FINDINGS: Suboptimal evaluation given lack of intravenous contrast.       LOWER THORAX: Atherosclerosis including the coronary arteries, partially   visualized. No global cardiomegaly or pericardial effusion. Minimal basilar   atelectasis. No pleural effusion. LIVER: Normal contours. No suspicious liver lesions on this unenhanced CT. GALLBLADDER AND BILIARY: Hyperdense material in the gallbladder lumen likely   represents vicarious excretion of contrast. No biliary dilation. SPLEEN: Normal.       PANCREAS: Normal.       ADRENALS: Normal.       KIDNEYS: Contrast seen within the cortex of both kidneys likely related to   recent CT chest. No perinephric stranding, radiopaque stone or hydronephrosis. GI TRACT: Small hiatal hernia present. Normal caliber small and large bowel   loops. No morphology of bowel obstruction. Normal appendix. BLADDER: Normal.       PELVIC ORGANS: Prior hysterectomy. VASCULATURE: Normal caliber lower thoracic and abdominal aorta. Dense calcified   atherosclerotic plaque especially in the infrarenal aorta and branch vessels. LYMPH NODES: No mesenteric or retroperitoneal lymphadenopathy. OTHER: No free intraperitoneal air. No ascites. Small broad-based fat-containing   umbilical hernia present. OSSEOUS STRUCTURES:  No acute osseous abnormality. Defect in the ventral right   iliac (axial image 82), likely postsurgical. Transitional lumbosacral anatomy   with trace anterolisthesis L5 on S1. Multilevel degenerative changes most   pronounced in the lumbar spine.       _______________           03/01/21 1519  CTA CHEST W OR W WO CONT Final result    Impression:      1. No evidence of pulmonary thromboembolic disease.      2.  No infiltrate or other acute pulmonary abnormality. Narrative:  EXAM: CTA CHEST W OR W WO CONT       CLINICAL INDICATION/HISTORY: Chest pain       COMPARISON: Chest radiograph same day       TECHNIQUE: Thin section CT was performed through the chest during pulmonary   arterial enhancement. MIP 3D reconstructions were generated to increase   sensitivity in the detection of pulmonary emboli. One or more dose reduction   techniques were used on this CT: automated exposure control, adjustment of the   mAs and/or kVp according to patient size, and iterative reconstruction   techniques. The specific techniques used on this CT exam have been documented   in the patient's electronic medical record. Digital Imaging and Communications   in Medicine (DICOM) format image data are available to nonaffiliated external   healthcare facilities or entities on a secure, media free, reciprocally   searchable basis with patient authorization for at least a 12-month period after   this study. --- EXAM QUALITY ---       1. Overall exam quality- satisfactory: adequate   2. Adequacy of pulmonary enhancement-adequate: sufficient enhancement for   diagnosis down to and including subsegmental vessels. Main PA density 190-250   HU   3. Adequacy of breath hold- adequate: sufficient enough to render diagnosis    4. There are no significant noise, beam hardening, streak artifacts affecting   scan quality. _______________       FINDINGS:       ---  PULMONARY CT ANGIOGRAM  ---       PULMONARY VASCULATURE: The right and left central, primary segmental and   subsegmental pulmonary arteries appear patent. There is no convincing evidence   of intraluminal filling defect identified to suggest pulmonary embolism. THORACIC AORTA: Normal caliber thoracic aorta. No aortic aneurysm, intramural   hematoma or dissection.            ---  CT CHEST ---       LUNG PARENCHYMA:  There is mild bibasilar dependent atelectasis. The lung   parenchyma appears otherwise clear. No pulmonary infiltration or consolidation    identified. No pulmonary nodule or mass identified. PLEURAL SPACES:   No pleural effusion or pneumothorax. MEDIASTINUM: Global thyromegaly. No thoracic lymphadenopathy. No global   cardiomegaly. No pericardial effusion. OSSEOUS STRUCTURES:   No acute osseous abnormality. Mild thoracic degenerative   disk disease. UPPER ABDOMEN: No acute abnormality. Small hiatal hernia. Diffuse hepatic   steatosis. _______________           03/01/21 1326  CT CHEST WO CONT Final result    Impression:      1. No acute abnormality. No suspicious pulmonary nodule, mass, or   consolidation. 2.  Atherosclerosis including the coronary arteries, correlate for acute   coronary syndrome. *    ** *        Narrative:  COMPARISON: CTA chest 3/1/2021 at 1511 hours. INDICATION: COPD, chronic obstructive pulmonary disease. History of hypertension   and high cholesterol. TECHNIQUE: Axial was performed through the chest without contrast.  Coronal and   sagittal reformations were generated. One or more dose reduction techniques were   used on this CT: automated exposure control, adjustment of the mAs and/or kVp   according to patient size, and iterative reconstruction techniques. The specific   techniques used on this CT exam have been documented in the patient's electronic   medical record. Digital Imaging and Communications in Medicine (DICOM) format   image data are available to nonaffiliated external healthcare facilities or   entities on a secure, media free, reciprocally searchable basis with patient   authorization for at least a 12-month period after this study.        ============   NECK: Thyroid normal.       HEART: The heart is normal size with mild aortic annular calcifications.  Dense   atherosclerotic calcifications throughout the coronary arteries. No pericardial   effusion. The great vessels are normal caliber. LUNGS: Mild bibasilar atelectasis. 4 mm pleural nodule along the right lower   lobe (axial image 132), likely pleural lymph node. No suspicious pulmonary   nodule, mass, or opacity. PLEURA: Normal, with no effusion or pneumothorax. AIRWAY: Unremarkable. LYMPH NODES: No mediastinal, hilar or axillary lymphadenopathy. Mildly enlarged   portacaval node measuring 1.2 cm (series 2 image 27), nonspecific. UPPER ABDOMEN: Small hiatal hernia present. Hyperdense material in the   gallbladder lumen likely represents sludge. OTHER: No acute osseous abnormality. Multilevel degenerative changes throughout   the spine.       ============               CXR Results  (Last 48 hours)               03/01/21 1353  XR CHEST PORT Final result    Impression:      No plain film evidence of acute cardiopulmonary disease, allowing for technique. Narrative:  HISTORY:    -Provided with order: chest pain   -Additional: None       Technique : AP PORTABLE CHEST       Comparison : None. FINDINGS:       HEART AND MEDIASTINUM: Unremarkable. LUNGS AND PLEURAL SPACES: No consolidation, congestive heart failure, pleural   effusion or pneumothorax. BONY THORAX AND SOFT TISSUES: No acute osseous abnormality.                    Cardiology Procedures:   Results for orders placed or performed during the hospital encounter of 03/01/21   EKG, 12 LEAD, INITIAL   Result Value Ref Range    Ventricular Rate 99 BPM    Atrial Rate 99 BPM    P-R Interval 138 ms    QRS Duration 84 ms    Q-T Interval 364 ms    QTC Calculation (Bezet) 467 ms    Calculated P Axis 62 degrees    Calculated R Axis 41 degrees    Calculated T Axis 61 degrees    Diagnosis       Normal sinus rhythm  Normal ECG  When compared with ECG of 04-OCT-2019 07:52,  No significant change was found        Echo Results  (Last 48 hours)    None Cardiolite (Tc-99m Sestamibi) stress test    Signed By: Ricco De Los Santos MD     March 2, 2021

## 2021-03-02 NOTE — PROGRESS NOTES
Problem: Falls - Risk of  Goal: *Absence of Falls  Description: Document Shruti Rajput Fall Risk and appropriate interventions in the flowsheet.   Outcome: Progressing Towards Goal  Note: Fall Risk Interventions:                                Problem: Patient Education: Go to Patient Education Activity  Goal: Patient/Family Education  Outcome: Progressing Towards Goal

## 2021-03-02 NOTE — PROGRESS NOTES
Reason for Admission:   Chart reviewed and noted that Pt presented to ED for chest pain. Pt has been admitted for obs to tele unit to r/o ACS. Pt has cardiology consult for echo and possible stress test. Patient's daughter lives with her  And spouse presently. 1152: Chart reviewed. CM spoke with the patient at the bedside and informed her of the CM's role. The patient confirmed demographics and PCP. CM and the patient discussed discharge plans to include transportation upon discharge, DME, family support, and transportation to and from appointments. The patient states that she currently lives at home with her  and other family members and that her  can pick her up at discharge in addition to ensuring that she gets to and from future MD visits. Oral and Written notification given to patient and/or caregiver informing them that they are currently an Outpatient receiving care in our facility. Outpatient services include Observation Services under [place] and [place] requirements. Patient denies any additional questions or concerns at this time. CMS referral placed. RUR Score:    NA                 Plan for utilizing home health:      TBD    PCP: First and Last name:  Goldy Solorio   Name of Practice: [facility]   Are you a current patient: Yes/No: Yes   Approximate date of last visit: Sometime last week per patient, next appointment scheduled for 3/22/21   Can you participate in a virtual visit with your PCP:                     Current Advanced Directive/Advance Care Plan: Full code, no ACP docs on file     Healthcare Decision Maker: Annalisa Farris June                         Transition of Care Plan:   Anticipate home with family      Care Management Interventions  PCP Verified by CM: Yes  Mode of Transport at Discharge:  Other (see comment)(family)  Transition of Care Consult (CM Consult): Discharge Planning  Current Support Network: Lives with Spouse  Confirm Follow Up Transport: Family  The Plan for Transition of Care is Related to the Following Treatment Goals : Chest pain  Discharge Location  Discharge Placement: Home with family assistance

## 2021-03-02 NOTE — PROGRESS NOTES
[id number]: The pt rested well overnight with complaints of back pain that was treated with medication per [facility]. No new clinical issues.      Night shift chart check completed

## 2021-03-02 NOTE — ROUTINE PROCESS
Bedside and Verbal shift change report given to Nichelle Aguirre RN (oncoming nurse) by Christie Carlson RN (offgoing nurse).  Report included the following information SBAR, Kardex, Intake/Output, MAR, and Cardiac Rhythm SR.

## 2021-03-03 ENCOUNTER — APPOINTMENT (OUTPATIENT)
Dept: NUCLEAR MEDICINE | Age: 66
DRG: 556 | End: 2021-03-03
Attending: INTERNAL MEDICINE
Payer: MEDICARE

## 2021-03-03 ENCOUNTER — APPOINTMENT (OUTPATIENT)
Dept: NON INVASIVE DIAGNOSTICS | Age: 66
DRG: 556 | End: 2021-03-03
Attending: INTERNAL MEDICINE
Payer: MEDICARE

## 2021-03-03 VITALS
OXYGEN SATURATION: 98 % | HEIGHT: 65 IN | RESPIRATION RATE: 15 BRPM | TEMPERATURE: 98.9 F | BODY MASS INDEX: 36.49 KG/M2 | HEART RATE: 99 BPM | DIASTOLIC BLOOD PRESSURE: 58 MMHG | SYSTOLIC BLOOD PRESSURE: 117 MMHG | WEIGHT: 219 LBS

## 2021-03-03 LAB
ANION GAP SERPL CALC-SCNC: 4 MMOL/L (ref 3–18)
BUN SERPL-MCNC: 13 MG/DL (ref 7–18)
BUN/CREAT SERPL: 13 (ref 12–20)
CALCIUM SERPL-MCNC: 9.4 MG/DL (ref 8.5–10.1)
CHLORIDE SERPL-SCNC: 105 MMOL/L (ref 100–111)
CO2 SERPL-SCNC: 29 MMOL/L (ref 21–32)
CREAT SERPL-MCNC: 1.03 MG/DL (ref 0.6–1.3)
GLUCOSE SERPL-MCNC: 94 MG/DL (ref 74–99)
POTASSIUM SERPL-SCNC: 3.6 MMOL/L (ref 3.5–5.5)
SODIUM SERPL-SCNC: 138 MMOL/L (ref 136–145)
STRESS ANGINA INDEX: 0
STRESS BASELINE HR: 90 BPM
STRESS ESTIMATED WORKLOAD: 6.1 METS
STRESS EXERCISE DUR MIN: NORMAL
STRESS PEAK DIAS BP: 75 MMHG
STRESS PEAK SYS BP: 141 MMHG
STRESS PERCENT HR ACHIEVED: 87 %
STRESS POST PEAK HR: 137 BPM
STRESS RATE PRESSURE PRODUCT: NORMAL BPM*MMHG
STRESS ST DEPRESSION: 0 MM
STRESS ST ELEVATION: 0 MM
STRESS TARGET HR: 157 BPM

## 2021-03-03 PROCEDURE — 36415 COLL VENOUS BLD VENIPUNCTURE: CPT

## 2021-03-03 PROCEDURE — 74011250637 HC RX REV CODE- 250/637: Performed by: HOSPITALIST

## 2021-03-03 PROCEDURE — 96372 THER/PROPH/DIAG INJ SC/IM: CPT

## 2021-03-03 PROCEDURE — 65270000029 HC RM PRIVATE

## 2021-03-03 PROCEDURE — 99218 HC RM OBSERVATION: CPT

## 2021-03-03 PROCEDURE — 80048 BASIC METABOLIC PNL TOTAL CA: CPT

## 2021-03-03 PROCEDURE — 93017 CV STRESS TEST TRACING ONLY: CPT

## 2021-03-03 PROCEDURE — 74011250636 HC RX REV CODE- 250/636: Performed by: HOSPITALIST

## 2021-03-03 RX ADMIN — PREGABALIN 100 MG: 100 CAPSULE ORAL at 11:19

## 2021-03-03 RX ADMIN — HEPARIN SODIUM 5000 UNITS: 5000 INJECTION INTRAVENOUS; SUBCUTANEOUS at 11:19

## 2021-03-03 RX ADMIN — HYDROCHLOROTHIAZIDE 12.5 MG: 25 TABLET ORAL at 11:18

## 2021-03-03 RX ADMIN — REGADENOSON 0.4 MG: 0.08 INJECTION, SOLUTION INTRAVENOUS at 08:00

## 2021-03-03 RX ADMIN — ASPIRIN 81 MG: 81 TABLET, CHEWABLE ORAL at 11:19

## 2021-03-03 RX ADMIN — HEPARIN SODIUM 5000 UNITS: 5000 INJECTION INTRAVENOUS; SUBCUTANEOUS at 01:58

## 2021-03-03 RX ADMIN — ALLOPURINOL 100 MG: 100 TABLET ORAL at 11:19

## 2021-03-03 NOTE — DISCHARGE SUMMARY
Discharge Summary    Patient: Denisse Emerson MRN: [id number]  CSN: [id number]    YOB: 1955  Age: 63 y.o. Sex: female    DOA: 3/1/2021 LOS:  LOS: 0 days   Discharge Date:      Primary Care Provider:  Lisa Chase DO    Admission Diagnoses: Chest pain [R07.9]    Discharge Diagnoses:    Hospital Problems  Date Reviewed: 10/30/2019          Codes Class Noted POA    * (Principal) Chest pain ICD-10-CM: R07.9  ICD-9-CM: 786.50  3/1/2021 Unknown        High cholesterol ICD-10-CM: E78.00  ICD-9-CM: 272.0  Unknown Unknown        Hypertension ICD-10-CM: I10  ICD-9-CM: 401.9  Unknown Unknown        Morbid obesity ([facility] [address] ICD-10-CM: E66.01  ICD-9-CM: 278.01  Unknown Unknown              Discharge Condition: stable     Discharge Medications:     Current Discharge Medication List      CONTINUE these medications which have NOT CHANGED    Details   allopurinol (ZYLOPRIM) 100 mg tablet Take 100 mg by mouth daily. pregabalin (LYRICA) 100 mg capsule Take 100 mg by mouth three (3) times daily. rosuvastatin (CRESTOR) 20 mg tablet Take 20 mg by mouth nightly. amitriptyline (ELAVIL) 10 mg tablet Take 25 mg by mouth nightly. naloxone (NARCAN) 4 mg/actuation nasal spray Use 1 spray intranasally, then discard. Repeat with new spray every 2 min as needed for opioid overdose symptoms, alternating nostrils. Qty: 3 Each, Refills: 0      escitalopram oxalate (LEXAPRO) 10 mg tablet Take 10 mg by mouth daily. hydroCHLOROthiazide (HYDRODIURIL) 12.5 mg tablet Take 12.5 mg by mouth daily. meloxicam (MOBIC) 15 mg tablet Take 15 mg by mouth daily. fluticasone propionate (FLOVENT DISKUS) 50 mcg/actuation inhaler Take 2 Puffs by inhalation daily as needed. polyvinyl alcohol-povidon,PF, (REFRESH CLASSIC) 1.4-0.6 % ophthalmic solution Administer 1-2 Drops to both eyes as needed.          STOP taking these medications       predniSONE (STERAPRED DS) 10 mg dose pack Comments:   Reason for Stopping: Procedures : none     Consults: Cardiology      PHYSICAL EXAM   Visit Vitals  /73   Pulse 96   Temp 98.1 °F (36.7 °C)   Resp 16   Ht 5' 5\" (1.651 m)   Wt 99.3 kg (219 lb)   SpO2 100%   BMI 36.44 kg/m²     General: Awake, cooperative, no acute distress    HEENT: NC, Atraumatic. PERRLA, EOMI. Anicteric sclerae. Lungs:  CTA Bilaterally. No Wheezing/Rhonchi/Rales. Heart:  Regular  rhythm,  No murmur, No Rubs, No Gallops  Abdomen: Soft, Non distended, Non tender. +Bowel sounds,   Extremities: No c/c/e  Psych:   Not anxious or agitated. Neurologic:  No acute neurological deficits. Admission HPI :     Sharri Brito is a 63 y.o. female with hypertension, hyperlipidemia fibromyalgia, smoker presented to ER due to chest pain. It is started from her right shoulder, down to her right chest, also involved right arm. She visited her primary care physician recently, she is supposed to have scheduled a stress test today. Denies any shortness of breath. She has a fibromyalgia, follow-up with pain specialist at [facility]. Reported pain in  back / hip /knee. ce [facility]   Hospital Course :   Sharri Brito is a 63 y.o. female with hypertension, hyperlipidemia fibromyalgia, smoker presented to ER due to chest pain. Since pt  was admitted, pt  was put on cardiac monitor and acs was ruled out. Cta negative for PE and stress test negative for ischemic change. Pt remained chest pain free. The chest pain was no not cardiogenic, possible due to fibromyalgia. Cardiologist was on board and cleared pt to be d/c home. Discharge planning discussed with patient, pt agrees  with the plan and no questions and concerns at this point.        Activity: Activity as tolerated    Diet: Cardiac Diet    Follow-up: PCP and Dr. Shauna Hooks     Disposition: home     Minutes spent on discharge: 45 min       Labs: Results:       Chemistry Recent Labs     03/03/21  0041 03/02/21  0240 03/01/21  1345 GLU 94 81 91    143 138   K 3.6 4.3 4.0    108 107   CO2 29 27 28   BUN 13 8 9   CREA 1.03 0.92 0.98   CA 9.4 9.2 9.5   AGAP 4 8 3   BUCR 13 9* 9*   AP  --   --  83   TP  --   --  8.1   ALB  --   --  3.8   GLOB  --   --  4.3*   AGRAT  --   --  0.9      CBC w/Diff Recent Labs     03/01/21  1345   WBC 6.4   RBC 4.40   HGB 13.5   HCT 41.6      GRANS 31*   LYMPH 52   EOS 2      Cardiac Enzymes Recent Labs     03/02/21  0900 03/02/21  0240   * 251*   CKND1 0.7 0.7      Coagulation No results for input(s): PTP, INR, APTT, INREXT in the last 72 hours. Lipid Panel Lab Results   Component Value Date/Time    Cholesterol, total 142 03/02/2021 02:40 AM    HDL Cholesterol 43 03/02/2021 02:40 AM    LDL, calculated 73.8 03/02/2021 02:40 AM    VLDL, calculated 25.2 03/02/2021 02:40 AM    Triglyceride 126 03/02/2021 02:40 AM    CHOL/HDL Ratio 3.3 03/02/2021 02:40 AM      BNP No results for input(s): BNPP in the last 72 hours. Liver Enzymes Recent Labs     03/01/21  1345   TP 8.1   ALB 3.8   AP 83      Thyroid Studies No results found for: T4, T3U, TSH, TSHEXT       @micro    Significant Diagnostic Studies: Ct Chest Wo Cont    Result Date: 3/2/2021  COMPARISON: CTA chest 3/1/2021 at 1511 hours. INDICATION: COPD, chronic obstructive pulmonary disease. History of hypertension and high cholesterol. TECHNIQUE: Axial was performed through the chest without contrast.  Coronal and sagittal reformations were generated. One or more dose reduction techniques were used on this CT: automated exposure control, adjustment of the mAs and/or kVp according to patient size, and iterative reconstruction techniques. The specific techniques used on this CT exam have been documented in the patient's electronic medical record.  Digital Imaging and Communications in Medicine (DICOM) format image data are available to nonaffiliated external healthcare facilities or entities on a secure, media free, reciprocally searchable basis with patient authorization for at least a 12-month period after this study. ============ NECK: Thyroid normal. HEART: The heart is normal size with mild aortic annular calcifications. Dense atherosclerotic calcifications throughout the coronary arteries. No pericardial effusion. The great vessels are normal caliber. LUNGS: Mild bibasilar atelectasis. 4 mm pleural nodule along the right lower lobe (axial image 132), likely pleural lymph node. No suspicious pulmonary nodule, mass, or opacity. PLEURA: Normal, with no effusion or pneumothorax. AIRWAY: Unremarkable. LYMPH NODES: No mediastinal, hilar or axillary lymphadenopathy. Mildly enlarged portacaval node measuring 1.2 cm (series 2 image 27), nonspecific. UPPER ABDOMEN: Small hiatal hernia present. Hyperdense material in the gallbladder lumen likely represents sludge. OTHER: No acute osseous abnormality. Multilevel degenerative changes throughout the spine. ============     1. No acute abnormality. No suspicious pulmonary nodule, mass, or consolidation. 2.  Atherosclerosis including the coronary arteries, correlate for acute coronary syndrome. Cta Chest W Or W Wo Cont    Result Date: 3/1/2021  EXAM: CTA CHEST W OR W WO CONT CLINICAL INDICATION/HISTORY: Chest pain COMPARISON: Chest radiograph same day TECHNIQUE: Thin section CT was performed through the chest during pulmonary arterial enhancement. MIP 3D reconstructions were generated to increase sensitivity in the detection of pulmonary emboli. One or more dose reduction techniques were used on this CT: automated exposure control, adjustment of the mAs and/or kVp according to patient size, and iterative reconstruction techniques. The specific techniques used on this CT exam have been documented in the patient's electronic medical record.   Digital Imaging and Communications in Medicine (DICOM) format image data are available to nonaffiliated external healthcare facilities or entities on a secure, media free, reciprocally searchable basis with patient authorization for at least a 12-month period after this study. --- EXAM QUALITY --- 1. Overall exam quality- satisfactory: adequate 2. Adequacy of pulmonary enhancement-adequate: sufficient enhancement for diagnosis down to and including subsegmental vessels. Main PA density 190-250 HU 3. Adequacy of breath hold- adequate: sufficient enough to render diagnosis 4. There are no significant noise, beam hardening, streak artifacts affecting scan quality. _______________ FINDINGS: ---  PULMONARY CT ANGIOGRAM  --- PULMONARY VASCULATURE: The right and left central, primary segmental and subsegmental pulmonary arteries appear patent. There is no convincing evidence of intraluminal filling defect identified to suggest pulmonary embolism. THORACIC AORTA: Normal caliber thoracic aorta. No aortic aneurysm, intramural hematoma or dissection. ---  CT CHEST --- LUNG PARENCHYMA:  There is mild bibasilar dependent atelectasis. The lung parenchyma appears otherwise clear. No pulmonary infiltration or consolidation identified. No pulmonary nodule or mass identified. PLEURAL SPACES:   No pleural effusion or pneumothorax. MEDIASTINUM: Global thyromegaly. No thoracic lymphadenopathy. No global cardiomegaly. No pericardial effusion. OSSEOUS STRUCTURES:   No acute osseous abnormality. Mild thoracic degenerative disk disease. UPPER ABDOMEN: No acute abnormality. Small hiatal hernia. Diffuse hepatic steatosis. _______________     1. No evidence of pulmonary thromboembolic disease. 2.  No infiltrate or other acute pulmonary abnormality. Ct Abd Pelv Wo Cont    Result Date: 3/2/2021  EXAM: CT ABD PELV WO CONT CLINICAL INDICATION/HISTORY: abdomen pain. Patient reports several years of left-sided abdominal pain. COMPARISON: CTA chest 3/1/2021 TECHNIQUE:   CT of the abdomen and pelvis without intravenous contrast administration.   Coronal and sagittal reformats were generated and reviewed. One or more dose reduction techniques were used on this CT: automated exposure control, adjustment of the mAs and/or kVp according to patient size, and iterative reconstruction techniques. The specific techniques used on this CT exam have been documented in the patient's electronic medical record. Digital Imaging and Communications in Medicine (DICOM) format image data are available to nonaffiliated external healthcare facilities or entities on a secure, media free, reciprocally searchable basis with patient authorization for at least a 12-month period after this study. _______________ FINDINGS: Suboptimal evaluation given lack of intravenous contrast. LOWER THORAX: Atherosclerosis including the coronary arteries, partially visualized. No global cardiomegaly or pericardial effusion. Minimal basilar atelectasis. No pleural effusion. LIVER: Normal contours. No suspicious liver lesions on this unenhanced CT. GALLBLADDER AND BILIARY: Hyperdense material in the gallbladder lumen likely represents vicarious excretion of contrast. No biliary dilation. SPLEEN: Normal. PANCREAS: Normal. ADRENALS: Normal. KIDNEYS: Contrast seen within the cortex of both kidneys likely related to recent CT chest. No perinephric stranding, radiopaque stone or hydronephrosis. GI TRACT: Small hiatal hernia present. Normal caliber small and large bowel loops. No morphology of bowel obstruction. Normal appendix. BLADDER: Normal. PELVIC ORGANS: Prior hysterectomy. VASCULATURE: Normal caliber lower thoracic and abdominal aorta. Dense calcified atherosclerotic plaque especially in the infrarenal aorta and branch vessels. LYMPH NODES: No mesenteric or retroperitoneal lymphadenopathy. OTHER: No free intraperitoneal air. No ascites. Small broad-based fat-containing umbilical hernia present. OSSEOUS STRUCTURES:  No acute osseous abnormality.  Defect in the ventral right iliac (axial image 82), likely postsurgical. Transitional lumbosacral anatomy with trace anterolisthesis L5 on S1. Multilevel degenerative changes most pronounced in the lumbar spine. _______________     1. Vicarious excretion of contrast in the gallbladder lumen with persistent renal cortical enhancement, correlate for renal impairment. No hydronephrosis. 2.  Please see recent CTA chest for additional details. 3.  Osseous degenerative changes and additional findings, as detailed in the body of the report. Xr Chest Port    Result Date: 3/1/2021  HISTORY: -Provided with order: chest pain -Additional: None Technique : AP PORTABLE CHEST Comparison : None. FINDINGS: HEART AND MEDIASTINUM: Unremarkable. LUNGS AND PLEURAL SPACES: No consolidation, congestive heart failure, pleural effusion or pneumothorax. BONY THORAX AND SOFT TISSUES: No acute osseous abnormality. No plain film evidence of acute cardiopulmonary disease, allowing for technique. Echo Adult Complete    Result Date: 3/2/2021  · LV: Estimated LVEF is 55 - 60%. Normal cavity size and systolic function (ejection fraction normal). Increased wall thickness. Mild (grade 1) left ventricular diastolic dysfunction E'E= 9.97. · AV: Aortic valve focal thickening present. · TV: Mild tricuspid valve regurgitation is present. Nuclear Cardiac Stress Test    Result Date: 3/3/2021  · Baseline ECG: Normal EKG. · Gated SPECT: Left ventricular function post-stress was normal. Calculated ejection fraction is 59%. There is no evidence of transient ischemic dilation (TID). The TID ratio is 1.01. · Low risk Duke treadmill score. · Negative stress test. · EKG stress test results correlate with a low risk of inducible myocardial ischemia.  · Left ventricular perfusion is normal. · Myocardial perfusion imaging supports a low risk stress test.              [place] AZUL,Internal Medicine     CC: Jennifer Gonzalez DO

## 2021-03-03 NOTE — PROGRESS NOTES
0800: Assumed patient care,,Patient has removed her telemetry monitor this nurse explained to patient why she needed her telemetry monitor on,,patient states \"well I have not had chest pain since I came up on this floor\",,telemetry monitor reappled. .patient is upset because she has been NPO since last night for stress test,,this nurse explained why she needed to be NPO but patient is still upset after in depth explanation    0900: Patient off the unit for stress test    1200: patient back to the unit slightly agitated because she is hungry,,this nurse order patient's lunch per instructions from stress test department and all of her AM medications given without any problems    1400: Patient is in the room being impatient refusing to wear her telemetry monitor and demanding to be discharge now,,this nurse notified charge nurse and monitor tech and called out to Dr Cally Sellers for her update,,this nurse explained to patient about AMA status,,patient is deciding to wait. .    1415: Patient called out again stating \"I have gone AMA before at [facility] and [facility] and she never had to be responsible for the bill,,spoke to  about patient's concerns and her behavior all this shift    1430: Dr Greer Aguayo at the bedside this nurse also have Lidyamed on the phone who is now speaking to Dr Cally Sellers    1700: Dr Cally Sellers at patient's bedside,,Dr Cally Sellers gave orders to discharge patient,,Dr Greer Aguayo already have discharge order in place,,this nurse reviewed patient's discharge orders with her but patient was not listening patient wanted to \"just leave now\",,patient stated she will read the instructions when she gets home

## 2021-03-03 NOTE — PROGRESS NOTES
Problem: Falls - Risk of  Goal: *Absence of Falls  Description: Document [place]via Fall Risk and appropriate interventions in the flowsheet.   Outcome: Progressing Towards Goal  Note: Fall Risk Interventions:            Medication Interventions: Patient to call before getting OOB, Teach patient to arise slowly                   Problem: Patient Education: Go to Patient Education Activity  Goal: Patient/Family Education  Outcome: Progressing Towards Goal     Problem: Patient Education: Go to Patient Education Activity  Goal: Patient/Family Education  Outcome: Progressing Towards Goal     Problem: Unstable angina/NSTEMI: Day 2  Goal: Off Pathway (Use only if patient is Off Pathway)  Outcome: Progressing Towards Goal  Goal: Activity/Safety  Outcome: Progressing Towards Goal  Goal: Consults, if ordered  Outcome: Progressing Towards Goal  Goal: Diagnostic Test/Procedures  Outcome: Progressing Towards Goal  Goal: Nutrition/Diet  Outcome: Progressing Towards Goal  Goal: Discharge Planning  Outcome: Progressing Towards Goal  Goal: Medications  Outcome: Progressing Towards Goal  Goal: Respiratory  Outcome: Progressing Towards Goal  Goal: Treatments/Interventions/Procedures  Outcome: Progressing Towards Goal  Goal: Psychosocial  Outcome: Progressing Towards Goal  Goal: *Hemodynamically stable  Outcome: Progressing Towards Goal  Goal: *Optimal pain control at patient's stated goal  Outcome: Progressing Towards Goal  Goal: *Lungs clear or at baseline  Outcome: Progressing Towards Goal     Problem: Unstable Angina/NSTEMI: Discharge Outcomes  Goal: *Hemodynamically stable  Outcome: Progressing Towards Goal  Goal: *Stable cardiac rhythm  Outcome: Progressing Towards Goal  Goal: *Lungs clear or at baseline  Outcome: Progressing Towards Goal  Goal: *Optimal pain control at patient's stated goal  Outcome: Progressing Towards Goal  Goal: *Identifies cardiac risk factors  Outcome: Progressing Towards Goal  Goal: *Verbalizes home exercise program, activity guidelines, cardiac precautions  Outcome: Progressing Towards Goal  Goal: *Verbalizes understanding and describes prescribed diet  Outcome: Progressing Towards Goal  Goal: *Verbalizes name, dosage, time, side effects, and number of days to continue medications  Outcome: Progressing Towards Goal  Goal: *Anxiety reduced or absent  Outcome: Progressing Towards Goal  Goal: *Understands and describes signs and symptoms to report to providers(Stroke Metric)  Outcome: Progressing Towards Goal  Goal: *Describes follow-up/return visits to physicians  Outcome: Progressing Towards Goal  Goal: *Describes available resources and support systems  Outcome: Progressing Towards Goal  Goal: *Influenza immunization  Outcome: Progressing Towards Goal  Goal: *Pneumococcal immunization  Outcome: Progressing Towards Goal  Goal: *Describes smoking cessation resources  Outcome: Progressing Towards Goal     Problem: General Medical Care Plan  Goal: *Vital signs within specified parameters  Outcome: Progressing Towards Goal  Goal: *Labs within defined limits  Outcome: Progressing Towards Goal  Goal: *Absence of infection signs and symptoms  Outcome: Progressing Towards Goal  Goal: *Optimal pain control at patient's stated goal  Outcome: Progressing Towards Goal  Goal: *Skin integrity maintained  Outcome: Progressing Towards Goal  Goal: *Fluid volume balance  Outcome: Progressing Towards Goal  Goal: *Optimize nutritional status  Outcome: Progressing Towards Goal  Goal: *Anxiety reduced or absent  Outcome: Progressing Towards Goal  Goal: *Progressive mobility and function (eg: ADL's)  Outcome: Progressing Towards Goal     Problem: Patient Education: Go to Patient Education Activity  Goal: Patient/Family Education  Outcome: Progressing Towards Goal

## 2021-03-03 NOTE — PROGRESS NOTES
1915  Assumed care of pt   1936  Shift assessment complete  Pt resting quietly with eyes open and chest rising and falling evenly  2336  Reassessment complete   Pt resting quietly with eyes open and chest rising and falling evenly  Pt NPO   0357  Reassessment complete    Shift uneventful  3 [facility] Cardiac/Medical Night Shift Chart Audit    Chart Audit completed? YES      Bedside and Verbal shift change report given to JULIETA Mullen (oncoming nurse) by Anh Graham RN (offgoing nurse). Report included the following information SBAR, Kardex, ED Summary, Intake/Output, MAR, Recent Results, Med Rec Status and Cardiac Rhythm NSR.

## 2021-03-03 NOTE — PROGRESS NOTES
Cardiology Progress Note        Patient: Darcie Dorsey        Sex: female          DOA: 3/1/2021  YOB: 1955      Age:  63 y.o.        LOS:  LOS: 0 days   Assessment/Plan     Principal Problem:    Chest pain (3/1/2021)    Active Problems:    High cholesterol ()      Hypertension ()      Morbid obesity (HCC) ()        Plan:  Chest pain patient improved  Cardiac telemetry stable  Exercise nuclear stress test is negative for ischemia normal EF  Discussed with patient  Patient can be discharged. Discussed with Dr. Tunde Fournier.                    Subjective:    cc:  Chest pain        REVIEW OF SYSTEMS:     General: No fevers or chills. Cardiovascular: No chest pain or pressure. No palpitations. No ankle swelling  Pulmonary: No SOB, orthopnea, PND  Gastrointestinal: No nausea, vomiting or diarrhea      Objective:      Visit Vitals  BP (!) 117/58   Pulse 99   Temp 98.9 °F (37.2 °C)   Resp 15   Ht 5' 5\" (1.651 m)   Wt 99.3 kg (219 lb)   SpO2 98%   BMI 36.44 kg/m²     Body mass index is 36.44 kg/m². Physical Exam:  General Appearance: Comfortable, not using accessory muscles of respiration. NECK: No JVD, no thyroidomeglay. LUNGS: Clear bilaterally. HEART: S1+S2 audible,    ABD: Non-tender, BS Audible    EXT: No edema, and no cysnosis. VASCULAR EXAM: Pulses are intact. PSYCHIATRIC EXAM: Mood is appropriate.     Medication:  Current Facility-Administered Medications   Medication Dose Route Frequency    aspirin chewable tablet 81 mg  81 mg Oral DAILY    nitroglycerin (NITROSTAT) tablet 0.4 mg  0.4 mg SubLINGual Q5MIN PRN    acetaminophen (TYLENOL) tablet 650 mg  650 mg Oral Q4H PRN    morphine injection 1 mg  1 mg IntraVENous Q4H PRN    heparin (porcine) injection 5,000 Units  5,000 Units SubCUTAneous Q8H    amitriptyline (ELAVIL) tablet 25 mg  25 mg Oral QHS    allopurinoL (ZYLOPRIM) tablet 100 mg  100 mg Oral DAILY    pregabalin (LYRICA) capsule 100 mg  100 mg Oral TID  rosuvastatin (CRESTOR) tablet 20 mg  20 mg Oral QHS    hydroCHLOROthiazide (HYDRODIURIL) tablet 12.5 mg  12.5 mg Oral DAILY               Lab/Data Reviewed:  Procedures/imaging: see electronic medical records for all procedures/Xrays   and details which were not copied into this note but were reviewed prior to creation of Plan       All lab results for the last 24 hours reviewed. Recent Labs     03/01/21  1345   WBC 6.4   HGB 13.5   HCT 41.6        Recent Labs     03/03/21  0041 03/02/21  0240 03/01/21  1345    143 138   K 3.6 4.3 4.0    108 107   CO2 29 27 28   GLU 94 81 91   BUN 13 8 9   CREA 1.03 0.92 0.98   CA 9.4 9.2 9.5       RADIOLOGY:  CT Results  (Last 48 hours)               03/02/21 1000  CT ABD PELV WO CONT Final result    Impression:      1. Vicarious excretion of contrast in the gallbladder lumen with persistent   renal cortical enhancement, correlate for renal impairment. No hydronephrosis. 2.  Please see recent CTA chest for additional details. 3.  Osseous degenerative changes and additional findings, as detailed in the   body of the report. Narrative:  EXAM: CT ABD PELV WO CONT       CLINICAL INDICATION/HISTORY: abdomen pain. Patient reports several years of   left-sided abdominal pain. COMPARISON: CTA chest 3/1/2021       TECHNIQUE:   CT of the abdomen and pelvis without intravenous contrast   administration. Coronal and sagittal reformats were generated and reviewed. One or more dose reduction techniques were used on this CT: automated exposure   control, adjustment of the mAs and/or kVp according to patient size, and   iterative reconstruction techniques. The specific techniques used on this CT   exam have been documented in the patient's electronic medical record.   Digital   Imaging and Communications in Medicine (DICOM) format image data are available   to nonaffiliated external healthcare facilities or entities on a secure, media   free, reciprocally searchable basis with patient authorization for at least a   12-month period after this study. _______________       FINDINGS: Suboptimal evaluation given lack of intravenous contrast.       LOWER THORAX: Atherosclerosis including the coronary arteries, partially   visualized. No global cardiomegaly or pericardial effusion. Minimal basilar   atelectasis. No pleural effusion. LIVER: Normal contours. No suspicious liver lesions on this unenhanced CT. GALLBLADDER AND BILIARY: Hyperdense material in the gallbladder lumen likely   represents vicarious excretion of contrast. No biliary dilation. SPLEEN: Normal.       PANCREAS: Normal.       ADRENALS: Normal.       KIDNEYS: Contrast seen within the cortex of both kidneys likely related to   recent CT chest. No perinephric stranding, radiopaque stone or hydronephrosis. GI TRACT: Small hiatal hernia present. Normal caliber small and large bowel   loops. No morphology of bowel obstruction. Normal appendix. BLADDER: Normal.       PELVIC ORGANS: Prior hysterectomy. VASCULATURE: Normal caliber lower thoracic and abdominal aorta. Dense calcified   atherosclerotic plaque especially in the infrarenal aorta and branch vessels. LYMPH NODES: No mesenteric or retroperitoneal lymphadenopathy. OTHER: No free intraperitoneal air. No ascites. Small broad-based fat-containing   umbilical hernia present. OSSEOUS STRUCTURES:  No acute osseous abnormality.  Defect in the ventral right   iliac (axial image 82), likely postsurgical. Transitional lumbosacral anatomy   with trace anterolisthesis L5 on S1. Multilevel degenerative changes most   pronounced in the lumbar spine.       _______________               CXR Results  (Last 48 hours)    None            Cardiology Procedures:   Results for orders placed or performed during the hospital encounter of 03/01/21   EKG, 12 LEAD, INITIAL Result Value Ref Range    Ventricular Rate 99 BPM    Atrial Rate 99 BPM    P-R Interval 138 ms    QRS Duration 84 ms    Q-T Interval 364 ms    QTC Calculation (Bezet) 467 ms    Calculated P Axis 62 degrees    Calculated R Axis 41 degrees    Calculated T Axis 61 degrees    Diagnosis       Normal sinus rhythm  Normal ECG  When compared with ECG of 04-OCT-2019 07:52,  No significant change was found  Confirmed by Yana Monet MD. ([id number]) on 3/2/2021 9:12:23 PM        Echo Results  (Last 48 hours)    None       Cardiolite (Tc-99m Sestamibi) stress test    Signed By: Glenna Quinn MD     March 3, 2021

## 2021-04-30 ENCOUNTER — TRANSCRIBE ORDER (OUTPATIENT)
Dept: REGISTRATION | Age: 66
End: 2021-04-30

## 2021-04-30 ENCOUNTER — HOSPITAL ENCOUNTER (OUTPATIENT)
Dept: LAB | Age: 66
Discharge: HOME | End: 2021-04-30
Payer: MEDICARE

## 2021-04-30 DIAGNOSIS — M25.561 RIGHT KNEE PAIN: ICD-10-CM

## 2021-04-30 DIAGNOSIS — M25.561 RIGHT KNEE PAIN: Primary | ICD-10-CM

## 2021-04-30 LAB
ALBUMIN SERPL-MCNC: 3.7 G/DL (ref 3.4–5)
ALBUMIN/GLOB SERPL: 0.9 {RATIO} (ref 0.8–1.7)
ALP SERPL-CCNC: 78 U/L (ref 45–117)
ALT SERPL-CCNC: 29 U/L (ref 13–56)
ANION GAP SERPL CALC-SCNC: 0 MMOL/L (ref 3–18)
AST SERPL-CCNC: 23 U/L (ref 10–38)
BASOPHILS # BLD: 0 K/UL (ref 0–0.1)
BASOPHILS NFR BLD: 1 % (ref 0–2)
BILIRUB SERPL-MCNC: 0.4 MG/DL (ref 0.2–1)
BUN SERPL-MCNC: 10 MG/DL (ref 7–18)
BUN/CREAT SERPL: 11 (ref 12–20)
CALCIUM SERPL-MCNC: 9.4 MG/DL (ref 8.5–10.1)
CHLORIDE SERPL-SCNC: 108 MMOL/L (ref 100–111)
CO2 SERPL-SCNC: 31 MMOL/L (ref 21–32)
CREAT SERPL-MCNC: 0.89 MG/DL (ref 0.6–1.3)
DIFFERENTIAL METHOD BLD: ABNORMAL
EOSINOPHIL # BLD: 0.1 K/UL (ref 0–0.4)
EOSINOPHIL NFR BLD: 3 % (ref 0–5)
ERYTHROCYTE [DISTWIDTH] IN BLOOD BY AUTOMATED COUNT: 13.7 % (ref 11.6–14.5)
GLOBULIN SER CALC-MCNC: 3.9 G/DL (ref 2–4)
GLUCOSE SERPL-MCNC: 84 MG/DL (ref 74–99)
HCT VFR BLD AUTO: 41.7 % (ref 35–45)
HGB BLD-MCNC: 12.9 G/DL (ref 12–16)
LYMPHOCYTES # BLD: 3 K/UL (ref 0.9–3.6)
LYMPHOCYTES NFR BLD: 57 % (ref 21–52)
MCH RBC QN AUTO: 29.3 PG (ref 24–34)
MCHC RBC AUTO-ENTMCNC: 30.9 G/DL (ref 31–37)
MCV RBC AUTO: 94.8 FL (ref 74–97)
MONOCYTES # BLD: 0.8 K/UL (ref 0.05–1.2)
MONOCYTES NFR BLD: 15 % (ref 3–10)
NEUTS SEG # BLD: 1.3 K/UL (ref 1.8–8)
NEUTS SEG NFR BLD: 25 % (ref 40–73)
PLATELET # BLD AUTO: 334 K/UL (ref 135–420)
PMV BLD AUTO: 10.1 FL (ref 9.2–11.8)
POTASSIUM SERPL-SCNC: 4.1 MMOL/L (ref 3.5–5.5)
PROT SERPL-MCNC: 7.6 G/DL (ref 6.4–8.2)
RBC # BLD AUTO: 4.4 M/UL (ref 4.2–5.3)
SODIUM SERPL-SCNC: 139 MMOL/L (ref 136–145)
WBC # BLD AUTO: 5.3 K/UL (ref 4.6–13.2)

## 2021-04-30 PROCEDURE — 85025 COMPLETE CBC W/AUTO DIFF WBC: CPT

## 2021-04-30 PROCEDURE — 80053 COMPREHEN METABOLIC PANEL: CPT

## 2021-04-30 PROCEDURE — 36415 COLL VENOUS BLD VENIPUNCTURE: CPT

## 2022-03-19 PROBLEM — R07.9 CHEST PAIN: Status: ACTIVE | Noted: 2021-03-01

## 2022-03-19 PROBLEM — M17.11 OSTEOARTHRITIS OF RIGHT KNEE: Status: ACTIVE | Noted: 2019-10-30

## 2022-10-18 NOTE — ANESTHESIA PREPROCEDURE EVALUATION
Relevant Problems   No relevant active problems       Anesthetic History   No history of anesthetic complications            Review of Systems / Medical History  Patient summary reviewed, nursing notes reviewed and pertinent labs reviewed    Pulmonary          Smoker         Neuro/Psych         Psychiatric history     Cardiovascular    Hypertension              Exercise tolerance: >4 METS     GI/Hepatic/Renal  Within defined limits              Endo/Other        Morbid obesity and arthritis     Other Findings              Physical Exam    Airway  Mallampati: II  TM Distance: 4 - 6 cm  Neck ROM: normal range of motion   Mouth opening: Normal     Cardiovascular  Regular rate and rhythm,  S1 and S2 normal,  no murmur, click, rub, or gallop             Dental    Dentition: Edentulous     Pulmonary  Breath sounds clear to auscultation               Abdominal  GI exam deferred       Other Findings            Anesthetic Plan    ASA: 2  Anesthesia type: general  Risk and benefits fully explained to the patient including bleeding, headache, nerve damage, infection, nausea, back pain, and hemodynamic changes. Patient understands and agrees to the procedure.     Post-op pain plan if not by surgeon: peripheral nerve block single    Induction: Intravenous  Anesthetic plan and risks discussed with: Patient Vaccine status unknown

## 2023-11-24 ENCOUNTER — HOSPITAL ENCOUNTER (EMERGENCY)
Facility: HOSPITAL | Age: 68
Discharge: HOME | End: 2023-11-24
Payer: MEDICARE

## 2023-11-24 VITALS
WEIGHT: 187 LBS | RESPIRATION RATE: 18 BRPM | DIASTOLIC BLOOD PRESSURE: 75 MMHG | BODY MASS INDEX: 31.16 KG/M2 | OXYGEN SATURATION: 100 % | TEMPERATURE: 97.7 F | HEIGHT: 65 IN | SYSTOLIC BLOOD PRESSURE: 143 MMHG | HEART RATE: 81 BPM

## 2023-11-24 DIAGNOSIS — U07.1 COVID-19: Primary | ICD-10-CM

## 2023-11-24 LAB
FLUAV RNA SPEC QL NAA+PROBE: NOT DETECTED
FLUBV RNA SPEC QL NAA+PROBE: NOT DETECTED
SARS-COV-2 RNA RESP QL NAA+PROBE: DETECTED

## 2023-11-24 PROCEDURE — 99283 EMERGENCY DEPT VISIT LOW MDM: CPT

## 2023-11-24 PROCEDURE — 87636 SARSCOV2 & INF A&B AMP PRB: CPT

## 2023-11-24 ASSESSMENT — PAIN - FUNCTIONAL ASSESSMENT: PAIN_FUNCTIONAL_ASSESSMENT: NONE - DENIES PAIN

## 2023-11-24 NOTE — ED PROVIDER NOTES
[facility] EMERGENCY DEPT  EMERGENCY DEPARTMENT ENCOUNTER       Pt Name: Edmar Samuels  MRN: [id number]  [address] [facility] [place] 1955  Date of evaluation: 2023  PCP: Deidre Bennett DO  Note Started: 6:24 PM 23     CHIEF COMPLAINT       Chief Complaint   Patient presents with    Concern For COVID-19        HISTORY OF PRESENT ILLNESS: 1 or more elements      History From: Patient  HPI Limitations: None  Chronic Conditions: OA, HTN, HLP, fibromyalgia  Social Determinants affecting Dx or Tx: none      Thu Lilly is a 79 y.o. female who presents to ED for COVID testing. Pt had exposure at home to [facility] but has no sxs. Pt is here with younger sister for same. Pt is fully vaccinated with booster but no recent omicron immunization. No fever, chills, congestion, sore throat, chest pain, SOB, myalgias     Nursing Notes were all reviewed and agreed with or any disagreements were addressed in the HPI. PAST HISTORY     Past Medical History:  Past Medical History:   Diagnosis Date    Arthritis     Autoimmune disease ([address])     fibromyalgia    Chronic back pain     High cholesterol     Hypertension     Morbid obesity ([address])     Pain management     Psychiatric disorder        Past Surgical History:  Past Surgical History:   Procedure Laterality Date    BACK SURGERY       SECTION      ORTHOPEDIC SURGERY      c 4 c5 l4 l5 sugery       Family History:  No family history on file. Social History:  Social History     Socioeconomic History    Marital status:    Tobacco Use    Smoking status: Every Day     Packs/day: .25     Types: Cigarettes    Smokeless tobacco: Never    Tobacco comments:     Quit smoking: advised to stop smoking 24hrs before surgery   Substance and Sexual Activity    Alcohol use: No    Drug use: No       Allergies:   Allergies   Allergen Reactions    Latex Rash    Penicillins      Other reaction(s): Unknown (comments)       CURRENT MEDICATIONS      No current facility-administered medications for

## 2024-03-25 ENCOUNTER — HOSPITAL ENCOUNTER (EMERGENCY)
Facility: HOSPITAL | Age: 69
Discharge: HOME | End: 2024-03-25
Payer: MEDICARE

## 2024-03-25 VITALS
SYSTOLIC BLOOD PRESSURE: 136 MMHG | HEART RATE: 85 BPM | DIASTOLIC BLOOD PRESSURE: 88 MMHG | TEMPERATURE: 97.7 F | BODY MASS INDEX: 33.28 KG/M2 | RESPIRATION RATE: 18 BRPM | OXYGEN SATURATION: 100 % | WEIGHT: 200 LBS

## 2024-03-25 DIAGNOSIS — M54.42 BILATERAL LOW BACK PAIN WITH BILATERAL SCIATICA, UNSPECIFIED CHRONICITY: Primary | ICD-10-CM

## 2024-03-25 DIAGNOSIS — M54.41 BILATERAL LOW BACK PAIN WITH BILATERAL SCIATICA, UNSPECIFIED CHRONICITY: Primary | ICD-10-CM

## 2024-03-25 PROCEDURE — 96372 THER/PROPH/DIAG INJ SC/IM: CPT

## 2024-03-25 PROCEDURE — 6360000002 HC RX W HCPCS: Performed by: PHYSICIAN ASSISTANT

## 2024-03-25 PROCEDURE — 99284 EMERGENCY DEPT VISIT MOD MDM: CPT

## 2024-03-25 RX ORDER — HYDROCODONE BITARTRATE AND ACETAMINOPHEN 5; 325 MG/1; MG/1
1 TABLET ORAL EVERY 4 HOURS PRN
Qty: 12 TABLET | Refills: 0 | Status: SHIPPED | OUTPATIENT
Start: 2024-03-25 | End: 2024-03-28

## 2024-03-25 RX ORDER — KETOROLAC TROMETHAMINE 15 MG/ML
30 INJECTION, SOLUTION INTRAMUSCULAR; INTRAVENOUS
Status: COMPLETED | OUTPATIENT
Start: 2024-03-25 | End: 2024-03-25

## 2024-03-25 RX ORDER — METHOCARBAMOL 500 MG/1
500 TABLET, FILM COATED ORAL 4 TIMES DAILY
Qty: 40 TABLET | Refills: 0 | Status: SHIPPED | OUTPATIENT
Start: 2024-03-25 | End: 2024-04-04

## 2024-03-25 RX ADMIN — KETOROLAC TROMETHAMINE 30 MG: 15 INJECTION, SOLUTION INTRAMUSCULAR; INTRAVENOUS at 13:05

## 2024-03-25 ASSESSMENT — PAIN SCALES - GENERAL: PAINLEVEL_OUTOF10: 8

## 2024-03-25 ASSESSMENT — PAIN - FUNCTIONAL ASSESSMENT: PAIN_FUNCTIONAL_ASSESSMENT: 0-10

## 2024-03-25 NOTE — ED TRIAGE NOTES
Sciatica pain on both sides had a joint steroid injection done three weeks ago but pain is too much states she can't wait anymore to see her PCP, all treatments are not working

## 2024-03-25 NOTE — ED PROVIDER NOTES
with PMD emphasized to patient.     No FND. No alarm sxs. Ambulatory. Will tx sxs. FU with PCP or PM. Reasons to RTED discussed with pt. All questions answered. Pt feels comfortable going home at this time. Pt expressed understanding and she agrees with plan.        FINAL IMPRESSION     1. Bilateral low back pain with bilateral sciatica, unspecified chronicity            DISPOSITION/PLAN   DISPOSITION Decision To Discharge 03/25/2024 01:12:28 PM           PATIENT REFERRED TO:  Thaddeus Moon DO  [address] [facility]  [address]  [place] [id number] [phone]          Boaz Cabrera MD  [address], [facility] [address]  [place] [id number] [phone]          [facility] EMERGENCY DEPT  [address]  [place] [address]  [phone]             DISCHARGE MEDICATIONS:     Medication List        START taking these medications      HYDROcodone-acetaminophen 5-325 MG per tablet  Commonly known as: Norco  Take 1 tablet by mouth every 4 hours as needed for Pain for up to 3 days. Intended supply: 3 days. Take lowest dose possible to manage pain Max Daily Amount: 6 tablets     methocarbamol 500 MG tablet  Commonly known as: ROBAXIN  Take 1 tablet by mouth 4 times daily for 10 days As needed for muscle spasms            ASK your doctor about these medications      allopurinol 100 MG tablet  Commonly known as: ZYLOPRIM     amitriptyline 10 MG tablet  Commonly known as: ELAVIL     escitalopram 10 MG tablet  Commonly known as: LEXAPRO     Fluticasone Propionate (Inhal) 50 MCG/ACT diskus inhaler  Commonly known as: FLOVENT DISKUS     hydroCHLOROthiazide 12.5 MG tablet     meloxicam 15 MG tablet  Commonly known as: MOBIC     naloxone 4 MG/0.1ML Liqd nasal spray     Polyvinyl Alcohol-Povidone PF 1.4-0.6 % Soln ophthalmic solution  Commonly known as: REFRESH     pregabalin 100 MG capsule  Commonly known as: LYRICA     rosuvastatin 20 MG tablet  Commonly known as: CRESTOR               Where to Get Your Medications        These

## (undated) DEVICE — STERILE POLYISOPRENE POWDER-FREE SURGICAL GLOVES: Brand: PROTEXIS

## (undated) DEVICE — STERILE POLYISOPRENE POWDER-FREE SURGICAL GLOVES WITH EMOLLIENT COATING: Brand: PROTEXIS

## (undated) DEVICE — SUT VCRL + 2-0 27IN CT2 VIO --

## (undated) DEVICE — DRSG FOAM MEPILX PST OP 4X12IN --

## (undated) DEVICE — ADHESIVE SKIN CLOSURE 4X22 CM PREMIERPRO EXOFINFUSION DISP

## (undated) DEVICE — GARMENT COMPR L FOR 13-16IN FT INTMIT SGL BLDR HEM FORC II

## (undated) DEVICE — SOL IRRIGATION INJ NACL 0.9% 500ML BTL

## (undated) DEVICE — (D)PREP SKN CHLRAPRP APPL 26ML -- CONVERT TO ITEM 371833

## (undated) DEVICE — REM POLYHESIVE ADULT PATIENT RETURN ELECTRODE: Brand: VALLEYLAB

## (undated) DEVICE — SOL IRR STRL H2O 1500ML BTL --

## (undated) DEVICE — OSCILLATING TIP SAW CARTRIDGE: Brand: PRECISION FALCON

## (undated) DEVICE — STERILE TETRA-FLEX CF LF, 6IN X 11 YD: Brand: TETRA-FLEX™ CF

## (undated) DEVICE — ZIMMER® STERILE DISPOSABLE TOURNIQUET CUFF WITH PROTECTIVE SLEEVE AND PLC, SINGLE PORT, SINGLE BLADDER, 34 IN. (86 CM)

## (undated) DEVICE — SUTURE MCRYL SZ 3-0 L27IN ABSRB UD L24MM PS-1 3/8 CIR PRIM Y936H

## (undated) DEVICE — HANDPIECE SET WITH FAN SPRAY TIP: Brand: INTERPULSE

## (undated) DEVICE — SUT VCRL + 1 36IN CT1 VIO --

## (undated) DEVICE — SOLUTION IRRIG 3000ML LAC R FLX CONT

## (undated) DEVICE — NEEDLE HYPO 21GA L1.5IN INTRAMUSCULAR S STL LATCH BVL UP

## (undated) DEVICE — PACK PROCEDURE SURG TOT KNEE CUST

## (undated) DEVICE — PIN FIX L3.5IN DIA1/8IN S STL FLUT HDLSS SQ END

## (undated) DEVICE — SYR LR LCK 1ML GRAD NSAF 30ML --